# Patient Record
Sex: MALE | Race: WHITE | NOT HISPANIC OR LATINO | Employment: FULL TIME | URBAN - METROPOLITAN AREA
[De-identification: names, ages, dates, MRNs, and addresses within clinical notes are randomized per-mention and may not be internally consistent; named-entity substitution may affect disease eponyms.]

---

## 2020-09-08 ENCOUNTER — LAB VISIT (OUTPATIENT)
Dept: PRIMARY CARE CLINIC | Facility: CLINIC | Age: 30
End: 2020-09-08
Payer: COMMERCIAL

## 2020-09-08 DIAGNOSIS — Z03.818 ENCOUNTER FOR OBSERVATION FOR SUSPECTED EXPOSURE TO OTHER BIOLOGICAL AGENTS RULED OUT: ICD-10-CM

## 2020-09-08 PROCEDURE — U0003 INFECTIOUS AGENT DETECTION BY NUCLEIC ACID (DNA OR RNA); SEVERE ACUTE RESPIRATORY SYNDROME CORONAVIRUS 2 (SARS-COV-2) (CORONAVIRUS DISEASE [COVID-19]), AMPLIFIED PROBE TECHNIQUE, MAKING USE OF HIGH THROUGHPUT TECHNOLOGIES AS DESCRIBED BY CMS-2020-01-R: HCPCS

## 2020-09-09 LAB — SARS-COV-2 RNA RESP QL NAA+PROBE: NOT DETECTED

## 2020-11-02 ENCOUNTER — OFFICE VISIT (OUTPATIENT)
Dept: ENDOCRINOLOGY | Facility: CLINIC | Age: 30
End: 2020-11-02
Payer: COMMERCIAL

## 2020-11-02 VITALS
BODY MASS INDEX: 23.97 KG/M2 | DIASTOLIC BLOOD PRESSURE: 76 MMHG | OXYGEN SATURATION: 96 % | WEIGHT: 171.19 LBS | HEART RATE: 88 BPM | RESPIRATION RATE: 18 BRPM | HEIGHT: 71 IN | SYSTOLIC BLOOD PRESSURE: 118 MMHG

## 2020-11-02 DIAGNOSIS — E16.2 HYPOGLYCEMIA: ICD-10-CM

## 2020-11-02 DIAGNOSIS — H35.049 RETINAL MICROANEURYSM, UNSPECIFIED LATERALITY: ICD-10-CM

## 2020-11-02 DIAGNOSIS — E10.9 TYPE 1 DIABETES MELLITUS WITHOUT COMPLICATION: Primary | ICD-10-CM

## 2020-11-02 PROCEDURE — 99999 PR PBB SHADOW E&M-EST. PATIENT-LVL IV: CPT | Mod: PBBFAC,,, | Performed by: INTERNAL MEDICINE

## 2020-11-02 PROCEDURE — 3008F BODY MASS INDEX DOCD: CPT | Mod: CPTII,S$GLB,, | Performed by: INTERNAL MEDICINE

## 2020-11-02 PROCEDURE — 99204 OFFICE O/P NEW MOD 45 MIN: CPT | Mod: S$GLB,,, | Performed by: INTERNAL MEDICINE

## 2020-11-02 PROCEDURE — 99204 PR OFFICE/OUTPT VISIT, NEW, LEVL IV, 45-59 MIN: ICD-10-PCS | Mod: S$GLB,,, | Performed by: INTERNAL MEDICINE

## 2020-11-02 PROCEDURE — 99999 PR PBB SHADOW E&M-EST. PATIENT-LVL IV: ICD-10-PCS | Mod: PBBFAC,,, | Performed by: INTERNAL MEDICINE

## 2020-11-02 PROCEDURE — 3008F PR BODY MASS INDEX (BMI) DOCUMENTED: ICD-10-PCS | Mod: CPTII,S$GLB,, | Performed by: INTERNAL MEDICINE

## 2020-11-02 RX ORDER — BLOOD-GLUCOSE TRANSMITTER
EACH MISCELLANEOUS
COMMUNITY
Start: 2015-07-06 | End: 2020-11-02

## 2020-11-02 RX ORDER — FLASH GLUCOSE SENSOR
KIT MISCELLANEOUS
COMMUNITY
Start: 2020-10-30 | End: 2021-01-26

## 2020-11-02 RX ORDER — INSULIN PUMP CONTROLLER
1 EACH MISCELLANEOUS
Qty: 10 EACH | Refills: 11 | Status: SHIPPED | OUTPATIENT
Start: 2020-11-02 | End: 2022-01-31

## 2020-11-02 RX ORDER — BLOOD-GLUCOSE TRANSMITTER
2 EACH MISCELLANEOUS
Qty: 2 DEVICE | Refills: 1 | Status: SHIPPED | OUTPATIENT
Start: 2020-11-02 | End: 2021-12-08

## 2020-11-02 RX ORDER — INSULIN GLARGINE 100 [IU]/ML
INJECTION, SOLUTION SUBCUTANEOUS
Qty: 1 SYRINGE | Refills: 3 | Status: SHIPPED | OUTPATIENT
Start: 2020-11-02 | End: 2021-12-08

## 2020-11-02 RX ORDER — INSULIN LISPRO 100 [IU]/ML
INJECTION, SOLUTION INTRAVENOUS; SUBCUTANEOUS
COMMUNITY
Start: 2015-06-26 | End: 2020-11-02

## 2020-11-02 RX ORDER — INSULIN GLARGINE 100 [IU]/ML
20 INJECTION, SOLUTION SUBCUTANEOUS DAILY
Refills: 0 | Status: CANCELLED | OUTPATIENT
Start: 2020-11-02 | End: 2021-11-02

## 2020-11-02 RX ORDER — BLOOD-GLUCOSE SENSOR
3 EACH MISCELLANEOUS
Qty: 3 DEVICE | Refills: 11 | Status: SHIPPED | OUTPATIENT
Start: 2020-11-02 | End: 2022-01-31

## 2020-11-02 RX ORDER — PEN NEEDLE, DIABETIC 30 GX3/16"
1 NEEDLE, DISPOSABLE MISCELLANEOUS DAILY
Qty: 30 EACH | Refills: 3 | Status: SHIPPED | OUTPATIENT
Start: 2020-11-02

## 2020-11-02 RX ORDER — INSULIN PUMP CONTROLLER
1 EACH MISCELLANEOUS ONCE
Qty: 1 EACH | Refills: 0 | Status: SHIPPED | OUTPATIENT
Start: 2020-11-02 | End: 2020-11-02

## 2020-11-02 RX ORDER — INSULIN ASPART 100 [IU]/ML
INJECTION, SOLUTION INTRAVENOUS; SUBCUTANEOUS
COMMUNITY
Start: 2020-10-03 | End: 2021-02-24 | Stop reason: SDUPTHER

## 2020-11-02 RX ORDER — INSULIN PUMP CONTROLLER
EACH MISCELLANEOUS
COMMUNITY
Start: 2020-10-28 | End: 2020-11-02

## 2020-11-02 RX ORDER — INSULIN ASPART 100 [IU]/ML
INJECTION, SOLUTION INTRAVENOUS; SUBCUTANEOUS
Status: CANCELLED | OUTPATIENT
Start: 2020-11-02 | End: 2021-11-02

## 2020-11-02 RX ORDER — BLOOD-GLUCOSE SENSOR
EACH MISCELLANEOUS
COMMUNITY
Start: 2015-07-08 | End: 2020-11-02

## 2020-11-02 NOTE — ASSESSMENT & PLAN NOTE
Per patient prior retina specialist unsure if related to trauma or DM  Refer to ophthalmology for ongoing care

## 2020-11-02 NOTE — ASSESSMENT & PLAN NOTE
Pump download reviewed. Not typically entering glucose with meals or for corrections  Using Rocío but would prefer to return to Dexcom which I agree would be better sensor for him with alerts and improved accuracy at low glucose    Based on trends will make the following changes:  Basal rate from 2-4 AM to 0.550    Change lunch carb ratio to 1:10    Add ISF from 12PM-12AM 1:50    Change IAT to 3.5 hours    BG threshold 100-->120    Enter glucose for all boluses    Fasting labs next few weeks

## 2020-11-02 NOTE — PROGRESS NOTES
Jamshid Norwood is a 30 y.o. male referred by Aaareferral Self for evaluation of T1DM    History of Present Illness  T1DM  Diagnosed at age 10 (2001)  Known complications: possible retinopathy although changes may be related to previous trauma to R eye    Has been managed by pediatric endocrinologist until recent move from [place] to [place]. 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 A1c in 7% range during that time  Started Omnipod and Dexcom around age 19-22 with improvement in A1c    Does not always use bolus wizard for correction. Will do math in his head. Finds that he has been having lows and night and tried to change basal but still around 4 AM. Also will have lows if takes correction in afternoon or at night    Tried fiasp in the past which didn't work well. Led to site issues    Likes omnipod and would like to stay on this      Current Diabetes Regimen:  Omnipod with novolog      Timing of prandial insulin: 10-15 minutes particularly with breakfast; closer to lunch and dinner  Omitted doses: denies    Last Hgb A1C:  No results found for: LABA1C, HGBA1C    Glucose Monitoring:  Meter/CGM: Rocío but would like to go back to Dexcom  Reviewed data in Rocío dave and gave information to link account    TIR  <70 12%   71%  181-240 13%  >240 4%      Hypoglycemic Episodes:  See above    Screening / DM Complications:    Nephropathy:  ACEi/ARB: Not taking  No results found for: MICALBCREAT    Last Lipid Panel:  Statin: Not taking  No results found for: LDLCALC      Last foot exam Most Recent Foot Exam Date: Not Found  Last eye exam Most Recent Eye Exam Date: Not Found;  no laser surgery or DR  Trauma to R eye in [facility]    No results found for: TSH    No results found for: TTGIGA            Current Outpatient Medications:     blood sugar diagnostic Strp, , Disp: , Rfl:     FREEOneRoofYLE ROCÍO 14 DAY SENSOR Kit, , Disp: , Rfl:     blood-glucose sensor  "(DEXCOM G6 SENSOR) Shannon, 3 Devices by Misc.(Non-Drug; Combo Route) route every 30 days., Disp: 3 Device, Rfl: 11    blood-glucose transmitter (DEXCOM G6 TRANSMITTER) Shannon, 2 Devices by Misc.(Non-Drug; Combo Route) route every 6 (six) months., Disp: 2 Device, Rfl: 1    insulin (LANTUS SOLOSTAR U-100 INSULIN) glargine 100 units/mL (3mL) SubQ pen, Take 20 units once daily in event of pump failure, Disp: 1 Syringe, Rfl: 3    insulin pump cartridge (OMNIPOD DASH 5 PACK POD) Crtg, Inject 1 each into the skin Every 3 (three) days., Disp: 10 each, Rfl: 11    insulin pump controller (OMNIPOD DASH PDM KIT) Misc, 1 each by Misc.(Non-Drug; Combo Route) route once. for 1 dose, Disp: 1 each, Rfl: 0    pen needle, diabetic (BD ULTRA-FINE DAMIR PEN NEEDLE) 32 gauge x 5/32" Ndle, 1 each by Misc.(Non-Drug; Combo Route) route once daily., Disp: 30 each, Rfl: 3    Review of Systems   Constitutional: Negative for fever.   Eyes: Negative for pain.   Respiratory: Negative for shortness of breath.    Cardiovascular: Negative for chest pain and leg swelling.   Gastrointestinal: Negative for abdominal pain.   Genitourinary: Negative for dysuria.   Musculoskeletal: Negative for arthralgias.   Skin: Negative for rash.   Neurological: Negative for headaches.   Psychiatric/Behavioral: Negative for confusion.       Objective:     Vitals:    11/02/20 1409   BP: 118/76   Pulse: 88   Resp: 18     Wt Readings from Last 3 Encounters:   11/02/20 77.7 kg (171 lb 3 oz)     Body mass index is 23.88 kg/m².  Physical Exam  Constitutional:       Appearance: He is well-developed.   HENT:      Head: Normocephalic.      Right Ear: External ear normal.      Left Ear: External ear normal.   Eyes:      Conjunctiva/sclera: Conjunctivae normal.      Pupils: Pupils are equal, round, and reactive to light.   Neck:      Thyroid: No thyromegaly.      Trachea: No tracheal deviation.      Comments: No thyromegaly or nodules  Cardiovascular:      Rate and Rhythm: " Normal rate and regular rhythm.      Heart sounds: No murmur.      Comments: No LE edema  Pulmonary:      Effort: Pulmonary effort is normal.      Breath sounds: Normal breath sounds.   Abdominal:      General: There is no distension.      Palpations: Abdomen is soft. There is no mass.      Tenderness: There is no abdominal tenderness.      Hernia: No hernia is present.      Comments: No scar tissue. Often uses legs for pump sites   Skin:     General: Skin is warm.      Findings: No rash.      Comments: No nodules   Neurological:      Mental Status: He is alert and oriented to person, place, and time.   Psychiatric:         Judgment: Judgment normal.         Labs    Chemistry    No results found for: NA, K, CL, CO2, BUN, CREATININE, GLU No results found for: CALCIUM, ALKPHOS, AST, ALT, BILITOT, ESTGFRAFRICA, EGFRNONAA           Assessment and Plan     Type 1 diabetes  Pump download reviewed. Not typically entering glucose with meals or for corrections  Using Rocío but would prefer to return to Dexcom which I agree would be better sensor for him with alerts and improved accuracy at low glucose    Based on trends will make the following changes:  Basal rate from 2-4 AM to 0.550    Change lunch carb ratio to 1:10    Add ISF from 12PM-12AM 1:50    Change IAT to 3.5 hours    BG threshold 100-->120    Enter glucose for all boluses    Fasting labs next few weeks    Retinal microaneurysm  Per patient prior retina specialist unsure if related to trauma or DM  Refer to ophthalmology for ongoing care      Hypoglycemia  See above        RTC 3 months    Katie Major MD

## 2020-11-04 ENCOUNTER — LAB VISIT (OUTPATIENT)
Dept: LAB | Facility: HOSPITAL | Age: 30
End: 2020-11-04
Attending: INTERNAL MEDICINE
Payer: COMMERCIAL

## 2020-11-04 DIAGNOSIS — E10.9 TYPE 1 DIABETES MELLITUS WITHOUT COMPLICATION: ICD-10-CM

## 2020-11-04 LAB
ALBUMIN SERPL BCP-MCNC: 3.9 G/DL (ref 3.5–5.2)
ALP SERPL-CCNC: 73 U/L (ref 55–135)
ALT SERPL W/O P-5'-P-CCNC: 21 U/L (ref 10–44)
ANION GAP SERPL CALC-SCNC: 10 MMOL/L (ref 8–16)
AST SERPL-CCNC: 21 U/L (ref 10–40)
BILIRUB SERPL-MCNC: 0.8 MG/DL (ref 0.1–1)
BUN SERPL-MCNC: 14 MG/DL (ref 6–20)
CALCIUM SERPL-MCNC: 9 MG/DL (ref 8.7–10.5)
CHLORIDE SERPL-SCNC: 101 MMOL/L (ref 95–110)
CHOLEST SERPL-MCNC: 156 MG/DL (ref 120–199)
CHOLEST/HDLC SERPL: 2.4 {RATIO} (ref 2–5)
CO2 SERPL-SCNC: 27 MMOL/L (ref 23–29)
CREAT SERPL-MCNC: 0.9 MG/DL (ref 0.5–1.4)
EST. GFR  (AFRICAN AMERICAN): >60 ML/MIN/1.73 M^2
EST. GFR  (NON AFRICAN AMERICAN): >60 ML/MIN/1.73 M^2
ESTIMATED AVG GLUCOSE: 154 MG/DL (ref 68–131)
GLUCOSE SERPL-MCNC: 175 MG/DL (ref 70–110)
HBA1C MFR BLD HPLC: 7 % (ref 4–5.6)
HDLC SERPL-MCNC: 66 MG/DL (ref 40–75)
HDLC SERPL: 42.3 % (ref 20–50)
LDLC SERPL CALC-MCNC: 81 MG/DL (ref 63–159)
NONHDLC SERPL-MCNC: 90 MG/DL
POTASSIUM SERPL-SCNC: 4.3 MMOL/L (ref 3.5–5.1)
PROT SERPL-MCNC: 7.1 G/DL (ref 6–8.4)
SODIUM SERPL-SCNC: 138 MMOL/L (ref 136–145)
TRIGL SERPL-MCNC: 45 MG/DL (ref 30–150)
TSH SERPL DL<=0.005 MIU/L-ACNC: 1.32 UIU/ML (ref 0.4–4)

## 2020-11-04 PROCEDURE — 80053 COMPREHEN METABOLIC PANEL: CPT

## 2020-11-04 PROCEDURE — 84443 ASSAY THYROID STIM HORMONE: CPT

## 2020-11-04 PROCEDURE — 36415 COLL VENOUS BLD VENIPUNCTURE: CPT

## 2020-11-04 PROCEDURE — 80061 LIPID PANEL: CPT

## 2020-11-04 PROCEDURE — 83036 HEMOGLOBIN GLYCOSYLATED A1C: CPT

## 2020-11-10 ENCOUNTER — TELEPHONE (OUTPATIENT)
Dept: ENDOCRINOLOGY | Facility: CLINIC | Age: 30
End: 2020-11-10

## 2020-11-16 ENCOUNTER — TELEPHONE (OUTPATIENT)
Dept: ENDOCRINOLOGY | Facility: CLINIC | Age: 30
End: 2020-11-16

## 2020-11-16 NOTE — TELEPHONE ENCOUNTER
----- Message from Stefany Huynh sent at 11/16/2020  8:16 AM CST -----  Contact: sobia Giang is asking for a call back in regards to the pts prescriptions. He stated that he need clarity on the quantity     insulin (LANTUS SOLOSTAR U-100 INSULIN) glargine 100 units/mL (3mL) SubQ pen    insulin pump cartridge (OMNIPOD DASH 5 PACK POD) Crtg 10 each     blood-glucose transmitter (DEXCOM G6 TRANSMITTER) Shannon 2 Device     Contact info- [phone]    Reference #- [id number]

## 2020-11-19 ENCOUNTER — TELEPHONE (OUTPATIENT)
Dept: ENDOCRINOLOGY | Facility: CLINIC | Age: 30
End: 2020-11-19

## 2021-01-25 ENCOUNTER — PATIENT MESSAGE (OUTPATIENT)
Dept: ENDOCRINOLOGY | Facility: CLINIC | Age: 31
End: 2021-01-25

## 2021-01-26 ENCOUNTER — PATIENT MESSAGE (OUTPATIENT)
Dept: ENDOCRINOLOGY | Facility: CLINIC | Age: 31
End: 2021-01-26

## 2021-01-26 RX ORDER — FLASH GLUCOSE SENSOR
1 KIT MISCELLANEOUS
Qty: 2 KIT | Refills: 11 | Status: SHIPPED | OUTPATIENT
Start: 2021-01-26 | End: 2021-06-16

## 2021-01-27 ENCOUNTER — PATIENT MESSAGE (OUTPATIENT)
Dept: ENDOCRINOLOGY | Facility: CLINIC | Age: 31
End: 2021-01-27

## 2021-02-09 ENCOUNTER — TELEPHONE (OUTPATIENT)
Dept: ENDOCRINOLOGY | Facility: CLINIC | Age: 31
End: 2021-02-09

## 2021-02-10 ENCOUNTER — TELEPHONE (OUTPATIENT)
Dept: ENDOCRINOLOGY | Facility: CLINIC | Age: 31
End: 2021-02-10

## 2021-02-23 ENCOUNTER — TELEPHONE (OUTPATIENT)
Dept: ENDOCRINOLOGY | Facility: CLINIC | Age: 31
End: 2021-02-23

## 2021-02-24 ENCOUNTER — OFFICE VISIT (OUTPATIENT)
Dept: ENDOCRINOLOGY | Facility: CLINIC | Age: 31
End: 2021-02-24
Payer: COMMERCIAL

## 2021-02-24 VITALS
DIASTOLIC BLOOD PRESSURE: 82 MMHG | WEIGHT: 169.75 LBS | SYSTOLIC BLOOD PRESSURE: 110 MMHG | BODY MASS INDEX: 23.68 KG/M2 | OXYGEN SATURATION: 99 % | HEART RATE: 70 BPM | RESPIRATION RATE: 16 BRPM

## 2021-02-24 DIAGNOSIS — Z96.41 INSULIN PUMP IN PLACE: ICD-10-CM

## 2021-02-24 DIAGNOSIS — E10.9 TYPE 1 DIABETES MELLITUS WITHOUT COMPLICATION: Primary | ICD-10-CM

## 2021-02-24 DIAGNOSIS — E16.2 HYPOGLYCEMIA: ICD-10-CM

## 2021-02-24 DIAGNOSIS — H35.049 RETINAL MICROANEURYSM, UNSPECIFIED LATERALITY: ICD-10-CM

## 2021-02-24 PROCEDURE — 99999 PR PBB SHADOW E&M-EST. PATIENT-LVL IV: ICD-10-PCS | Mod: PBBFAC,,,

## 2021-02-24 PROCEDURE — 99214 OFFICE O/P EST MOD 30 MIN: CPT | Mod: 25,S$GLB,, | Performed by: INTERNAL MEDICINE

## 2021-02-24 PROCEDURE — 95251 PR GLUCOSE MONITOR, 72 HOUR, PHYS INTERP: ICD-10-PCS | Mod: S$GLB,,, | Performed by: INTERNAL MEDICINE

## 2021-02-24 PROCEDURE — 95251 CONT GLUC MNTR ANALYSIS I&R: CPT | Mod: S$GLB,,, | Performed by: INTERNAL MEDICINE

## 2021-02-24 PROCEDURE — 99214 PR OFFICE/OUTPT VISIT, EST, LEVL IV, 30-39 MIN: ICD-10-PCS | Mod: 25,S$GLB,, | Performed by: INTERNAL MEDICINE

## 2021-02-24 PROCEDURE — 99999 PR PBB SHADOW E&M-EST. PATIENT-LVL IV: CPT | Mod: PBBFAC,,,

## 2021-02-24 PROCEDURE — 3051F HG A1C>EQUAL 7.0%<8.0%: CPT | Mod: CPTII,S$GLB,, | Performed by: INTERNAL MEDICINE

## 2021-02-24 PROCEDURE — 3051F PR MOST RECENT HEMOGLOBIN A1C LEVEL 7.0 - < 8.0%: ICD-10-PCS | Mod: CPTII,S$GLB,, | Performed by: INTERNAL MEDICINE

## 2021-02-24 RX ORDER — INSULIN ASPART 100 [IU]/ML
INJECTION, SOLUTION INTRAVENOUS; SUBCUTANEOUS
Qty: 2 VIAL | Refills: 8 | Status: SHIPPED | OUTPATIENT
Start: 2021-02-24 | End: 2022-01-19 | Stop reason: SDUPTHER

## 2021-05-12 ENCOUNTER — OFFICE VISIT (OUTPATIENT)
Dept: OPTOMETRY | Facility: CLINIC | Age: 31
End: 2021-05-12
Payer: COMMERCIAL

## 2021-05-12 DIAGNOSIS — E10.9 TYPE 1 DIABETES MELLITUS WITHOUT COMPLICATION: ICD-10-CM

## 2021-05-12 DIAGNOSIS — Z96.41 INSULIN PUMP IN PLACE: ICD-10-CM

## 2021-05-12 DIAGNOSIS — Z98.890 HISTORY OF REPAIR OF RETINAL DEFECT BY LASER PHOTOCOAGULATION: ICD-10-CM

## 2021-05-12 DIAGNOSIS — H52.13 MYOPIA, BILATERAL: Primary | ICD-10-CM

## 2021-05-12 PROCEDURE — 92004 PR EYE EXAM, NEW PATIENT,COMPREHESV: ICD-10-PCS | Mod: S$GLB,,, | Performed by: OPTOMETRIST

## 2021-05-12 PROCEDURE — 99999 PR PBB SHADOW E&M-EST. PATIENT-LVL II: ICD-10-PCS | Mod: PBBFAC,,, | Performed by: OPTOMETRIST

## 2021-05-12 PROCEDURE — 99999 PR PBB SHADOW E&M-EST. PATIENT-LVL II: CPT | Mod: PBBFAC,,, | Performed by: OPTOMETRIST

## 2021-05-12 PROCEDURE — 92004 COMPRE OPH EXAM NEW PT 1/>: CPT | Mod: S$GLB,,, | Performed by: OPTOMETRIST

## 2021-05-12 PROCEDURE — 92250 COLOR FUNDUS PHOTOGRAPHY - OU - BOTH EYES: ICD-10-PCS | Mod: S$GLB,,, | Performed by: OPTOMETRIST

## 2021-05-12 PROCEDURE — 92015 PR REFRACTION: ICD-10-PCS | Mod: S$GLB,,, | Performed by: OPTOMETRIST

## 2021-05-12 PROCEDURE — 92015 DETERMINE REFRACTIVE STATE: CPT | Mod: S$GLB,,, | Performed by: OPTOMETRIST

## 2021-05-12 PROCEDURE — 92250 FUNDUS PHOTOGRAPHY W/I&R: CPT | Mod: S$GLB,,, | Performed by: OPTOMETRIST

## 2021-05-26 ENCOUNTER — PATIENT OUTREACH (OUTPATIENT)
Dept: ENDOCRINOLOGY | Facility: CLINIC | Age: 31
End: 2021-05-26

## 2021-05-26 ENCOUNTER — OFFICE VISIT (OUTPATIENT)
Dept: ENDOCRINOLOGY | Facility: CLINIC | Age: 31
End: 2021-05-26
Payer: COMMERCIAL

## 2021-05-26 VITALS
SYSTOLIC BLOOD PRESSURE: 117 MMHG | OXYGEN SATURATION: 97 % | BODY MASS INDEX: 22.81 KG/M2 | WEIGHT: 162.94 LBS | DIASTOLIC BLOOD PRESSURE: 73 MMHG | HEART RATE: 87 BPM | HEIGHT: 71 IN

## 2021-05-26 DIAGNOSIS — E10.9 TYPE 1 DIABETES MELLITUS WITHOUT COMPLICATION: ICD-10-CM

## 2021-05-26 DIAGNOSIS — Z96.41 INSULIN PUMP IN PLACE: ICD-10-CM

## 2021-05-26 DIAGNOSIS — H35.049 RETINAL MICROANEURYSM, UNSPECIFIED LATERALITY: ICD-10-CM

## 2021-05-26 PROCEDURE — 3051F HG A1C>EQUAL 7.0%<8.0%: CPT | Mod: CPTII,S$GLB,, | Performed by: INTERNAL MEDICINE

## 2021-05-26 PROCEDURE — 3051F PR MOST RECENT HEMOGLOBIN A1C LEVEL 7.0 - < 8.0%: ICD-10-PCS | Mod: CPTII,S$GLB,, | Performed by: INTERNAL MEDICINE

## 2021-05-26 PROCEDURE — 95251 CONT GLUC MNTR ANALYSIS I&R: CPT | Mod: S$GLB,,, | Performed by: INTERNAL MEDICINE

## 2021-05-26 PROCEDURE — 95251 PR GLUCOSE MONITOR, 72 HOUR, PHYS INTERP: ICD-10-PCS | Mod: S$GLB,,, | Performed by: INTERNAL MEDICINE

## 2021-05-26 PROCEDURE — 99214 OFFICE O/P EST MOD 30 MIN: CPT | Mod: 25,S$GLB,, | Performed by: INTERNAL MEDICINE

## 2021-05-26 PROCEDURE — 99214 PR OFFICE/OUTPT VISIT, EST, LEVL IV, 30-39 MIN: ICD-10-PCS | Mod: 25,S$GLB,, | Performed by: INTERNAL MEDICINE

## 2021-05-26 PROCEDURE — 99999 PR PBB SHADOW E&M-EST. PATIENT-LVL IV: CPT | Mod: PBBFAC,,,

## 2021-05-26 PROCEDURE — 99999 PR PBB SHADOW E&M-EST. PATIENT-LVL IV: ICD-10-PCS | Mod: PBBFAC,,,

## 2021-06-09 ENCOUNTER — OFFICE VISIT (OUTPATIENT)
Dept: OPHTHALMOLOGY | Facility: CLINIC | Age: 31
End: 2021-06-09
Payer: COMMERCIAL

## 2021-06-09 DIAGNOSIS — E10.9 TYPE 1 DIABETES MELLITUS WITHOUT RETINOPATHY: ICD-10-CM

## 2021-06-09 DIAGNOSIS — H35.371 PRERETINAL FIBROSIS, RIGHT: Primary | ICD-10-CM

## 2021-06-09 PROCEDURE — 1126F PR PAIN SEVERITY QUANTIFIED, NO PAIN PRESENT: ICD-10-PCS | Mod: S$GLB,,, | Performed by: OPHTHALMOLOGY

## 2021-06-09 PROCEDURE — 92202 OPSCPY EXTND ON/MAC DRAW: CPT | Mod: S$GLB,,, | Performed by: OPHTHALMOLOGY

## 2021-06-09 PROCEDURE — 99999 PR PBB SHADOW E&M-EST. PATIENT-LVL III: ICD-10-PCS | Mod: PBBFAC,,, | Performed by: OPHTHALMOLOGY

## 2021-06-09 PROCEDURE — 92202 PR OPHTHALMOSCOPY, EXT, W/DRAW OPTIC NERVE/MACULA, I&R, UNI/BI: ICD-10-PCS | Mod: S$GLB,,, | Performed by: OPHTHALMOLOGY

## 2021-06-09 PROCEDURE — 1126F AMNT PAIN NOTED NONE PRSNT: CPT | Mod: S$GLB,,, | Performed by: OPHTHALMOLOGY

## 2021-06-09 PROCEDURE — 92134 CPTRZ OPH DX IMG PST SGM RTA: CPT | Mod: S$GLB,,, | Performed by: OPHTHALMOLOGY

## 2021-06-09 PROCEDURE — 92004 PR EYE EXAM, NEW PATIENT,COMPREHESV: ICD-10-PCS | Mod: S$GLB,,, | Performed by: OPHTHALMOLOGY

## 2021-06-09 PROCEDURE — 2023F DILAT RTA XM W/O RTNOPTHY: CPT | Mod: S$GLB,,, | Performed by: OPHTHALMOLOGY

## 2021-06-09 PROCEDURE — 92134 POSTERIOR SEGMENT OCT RETINA (OCULAR COHERENCE TOMOGRAPHY)-BOTH EYES: ICD-10-PCS | Mod: S$GLB,,, | Performed by: OPHTHALMOLOGY

## 2021-06-09 PROCEDURE — 92004 COMPRE OPH EXAM NEW PT 1/>: CPT | Mod: S$GLB,,, | Performed by: OPHTHALMOLOGY

## 2021-06-09 PROCEDURE — 2023F PR DILATED RETINAL EXAM W/O EVID OF RETINOPATHY: ICD-10-PCS | Mod: S$GLB,,, | Performed by: OPHTHALMOLOGY

## 2021-06-09 PROCEDURE — 99999 PR PBB SHADOW E&M-EST. PATIENT-LVL III: CPT | Mod: PBBFAC,,, | Performed by: OPHTHALMOLOGY

## 2021-06-16 ENCOUNTER — OFFICE VISIT (OUTPATIENT)
Dept: INTERNAL MEDICINE | Facility: CLINIC | Age: 31
End: 2021-06-16
Payer: COMMERCIAL

## 2021-06-16 VITALS
HEIGHT: 71 IN | WEIGHT: 163.13 LBS | OXYGEN SATURATION: 97 % | BODY MASS INDEX: 22.84 KG/M2 | DIASTOLIC BLOOD PRESSURE: 72 MMHG | HEART RATE: 84 BPM | SYSTOLIC BLOOD PRESSURE: 120 MMHG

## 2021-06-16 DIAGNOSIS — Z23 NEED FOR TDAP VACCINATION: ICD-10-CM

## 2021-06-16 DIAGNOSIS — Z00.00 ANNUAL PHYSICAL EXAM: Primary | ICD-10-CM

## 2021-06-16 DIAGNOSIS — E10.9 TYPE 1 DIABETES MELLITUS WITHOUT COMPLICATION: ICD-10-CM

## 2021-06-16 DIAGNOSIS — Z20.2 POSSIBLE EXPOSURE TO STD: ICD-10-CM

## 2021-06-16 PROCEDURE — 1126F PR PAIN SEVERITY QUANTIFIED, NO PAIN PRESENT: ICD-10-PCS | Mod: S$GLB,,, | Performed by: FAMILY MEDICINE

## 2021-06-16 PROCEDURE — 99385 PR PREVENTIVE VISIT,NEW,18-39: ICD-10-PCS | Mod: 25,S$GLB,, | Performed by: FAMILY MEDICINE

## 2021-06-16 PROCEDURE — 99999 PR PBB SHADOW E&M-EST. PATIENT-LVL IV: CPT | Mod: PBBFAC,,, | Performed by: FAMILY MEDICINE

## 2021-06-16 PROCEDURE — 3008F PR BODY MASS INDEX (BMI) DOCUMENTED: ICD-10-PCS | Mod: CPTII,S$GLB,, | Performed by: FAMILY MEDICINE

## 2021-06-16 PROCEDURE — 99385 PREV VISIT NEW AGE 18-39: CPT | Mod: 25,S$GLB,, | Performed by: FAMILY MEDICINE

## 2021-06-16 PROCEDURE — 1126F AMNT PAIN NOTED NONE PRSNT: CPT | Mod: S$GLB,,, | Performed by: FAMILY MEDICINE

## 2021-06-16 PROCEDURE — 90471 TDAP VACCINE GREATER THAN OR EQUAL TO 7YO IM: ICD-10-PCS | Mod: S$GLB,,, | Performed by: FAMILY MEDICINE

## 2021-06-16 PROCEDURE — 3008F BODY MASS INDEX DOCD: CPT | Mod: CPTII,S$GLB,, | Performed by: FAMILY MEDICINE

## 2021-06-16 PROCEDURE — 90471 IMMUNIZATION ADMIN: CPT | Mod: S$GLB,,, | Performed by: FAMILY MEDICINE

## 2021-06-16 PROCEDURE — 90715 TDAP VACCINE GREATER THAN OR EQUAL TO 7YO IM: ICD-10-PCS | Mod: S$GLB,,, | Performed by: FAMILY MEDICINE

## 2021-06-16 PROCEDURE — 99999 PR PBB SHADOW E&M-EST. PATIENT-LVL IV: ICD-10-PCS | Mod: PBBFAC,,, | Performed by: FAMILY MEDICINE

## 2021-06-16 PROCEDURE — 3051F PR MOST RECENT HEMOGLOBIN A1C LEVEL 7.0 - < 8.0%: ICD-10-PCS | Mod: CPTII,S$GLB,, | Performed by: FAMILY MEDICINE

## 2021-06-16 PROCEDURE — 3051F HG A1C>EQUAL 7.0%<8.0%: CPT | Mod: CPTII,S$GLB,, | Performed by: FAMILY MEDICINE

## 2021-06-16 PROCEDURE — 90715 TDAP VACCINE 7 YRS/> IM: CPT | Mod: S$GLB,,, | Performed by: FAMILY MEDICINE

## 2021-07-02 ENCOUNTER — LAB VISIT (OUTPATIENT)
Dept: LAB | Facility: HOSPITAL | Age: 31
End: 2021-07-02
Attending: FAMILY MEDICINE
Payer: COMMERCIAL

## 2021-07-02 DIAGNOSIS — Z20.2 POSSIBLE EXPOSURE TO STD: ICD-10-CM

## 2021-07-02 DIAGNOSIS — Z00.00 ANNUAL PHYSICAL EXAM: ICD-10-CM

## 2021-07-02 LAB
ALBUMIN SERPL BCP-MCNC: 3.7 G/DL (ref 3.5–5.2)
ALP SERPL-CCNC: 70 U/L (ref 55–135)
ALT SERPL W/O P-5'-P-CCNC: 26 U/L (ref 10–44)
ANION GAP SERPL CALC-SCNC: 8 MMOL/L (ref 8–16)
AST SERPL-CCNC: 20 U/L (ref 10–40)
BASOPHILS # BLD AUTO: 0.06 K/UL (ref 0–0.2)
BASOPHILS NFR BLD: 1.1 % (ref 0–1.9)
BILIRUB SERPL-MCNC: 0.9 MG/DL (ref 0.1–1)
BUN SERPL-MCNC: 10 MG/DL (ref 6–20)
CALCIUM SERPL-MCNC: 9.1 MG/DL (ref 8.7–10.5)
CHLORIDE SERPL-SCNC: 104 MMOL/L (ref 95–110)
CHOLEST SERPL-MCNC: 140 MG/DL (ref 120–199)
CHOLEST/HDLC SERPL: 2.6 {RATIO} (ref 2–5)
CO2 SERPL-SCNC: 25 MMOL/L (ref 23–29)
CREAT SERPL-MCNC: 0.9 MG/DL (ref 0.5–1.4)
DIFFERENTIAL METHOD: NORMAL
EOSINOPHIL # BLD AUTO: 0.2 K/UL (ref 0–0.5)
EOSINOPHIL NFR BLD: 2.8 % (ref 0–8)
ERYTHROCYTE [DISTWIDTH] IN BLOOD BY AUTOMATED COUNT: 11.7 % (ref 11.5–14.5)
EST. GFR  (AFRICAN AMERICAN): >60 ML/MIN/1.73 M^2
EST. GFR  (NON AFRICAN AMERICAN): >60 ML/MIN/1.73 M^2
ESTIMATED AVG GLUCOSE: 128 MG/DL (ref 68–131)
GLUCOSE SERPL-MCNC: 154 MG/DL (ref 70–110)
HBA1C MFR BLD: 6.1 % (ref 4–5.6)
HCT VFR BLD AUTO: 42.2 % (ref 40–54)
HDLC SERPL-MCNC: 54 MG/DL (ref 40–75)
HDLC SERPL: 38.6 % (ref 20–50)
HGB BLD-MCNC: 14 G/DL (ref 14–18)
IMM GRANULOCYTES # BLD AUTO: 0.01 K/UL (ref 0–0.04)
IMM GRANULOCYTES NFR BLD AUTO: 0.2 % (ref 0–0.5)
LDLC SERPL CALC-MCNC: 78.8 MG/DL (ref 63–159)
LYMPHOCYTES # BLD AUTO: 1.7 K/UL (ref 1–4.8)
LYMPHOCYTES NFR BLD: 31.6 % (ref 18–48)
MCH RBC QN AUTO: 30 PG (ref 27–31)
MCHC RBC AUTO-ENTMCNC: 33.2 G/DL (ref 32–36)
MCV RBC AUTO: 90 FL (ref 82–98)
MONOCYTES # BLD AUTO: 0.6 K/UL (ref 0.3–1)
MONOCYTES NFR BLD: 11.2 % (ref 4–15)
NEUTROPHILS # BLD AUTO: 2.8 K/UL (ref 1.8–7.7)
NEUTROPHILS NFR BLD: 53.1 % (ref 38–73)
NONHDLC SERPL-MCNC: 86 MG/DL
NRBC BLD-RTO: 0 /100 WBC
PLATELET # BLD AUTO: 297 K/UL (ref 150–450)
PMV BLD AUTO: 12 FL (ref 9.2–12.9)
POTASSIUM SERPL-SCNC: 4.2 MMOL/L (ref 3.5–5.1)
PROT SERPL-MCNC: 7 G/DL (ref 6–8.4)
RBC # BLD AUTO: 4.67 M/UL (ref 4.6–6.2)
SODIUM SERPL-SCNC: 137 MMOL/L (ref 136–145)
TRIGL SERPL-MCNC: 36 MG/DL (ref 30–150)
TSH SERPL DL<=0.005 MIU/L-ACNC: 1.23 UIU/ML (ref 0.4–4)
WBC # BLD AUTO: 5.28 K/UL (ref 3.9–12.7)

## 2021-07-02 PROCEDURE — 87389 HIV-1 AG W/HIV-1&-2 AB AG IA: CPT | Performed by: FAMILY MEDICINE

## 2021-07-02 PROCEDURE — 86695 HERPES SIMPLEX TYPE 1 TEST: CPT | Performed by: FAMILY MEDICINE

## 2021-07-02 PROCEDURE — 36415 COLL VENOUS BLD VENIPUNCTURE: CPT | Performed by: FAMILY MEDICINE

## 2021-07-02 PROCEDURE — 86694 HERPES SIMPLEX NES ANTBDY: CPT | Performed by: FAMILY MEDICINE

## 2021-07-02 PROCEDURE — 86803 HEPATITIS C AB TEST: CPT | Performed by: FAMILY MEDICINE

## 2021-07-02 PROCEDURE — 84443 ASSAY THYROID STIM HORMONE: CPT | Performed by: FAMILY MEDICINE

## 2021-07-02 PROCEDURE — 83036 HEMOGLOBIN GLYCOSYLATED A1C: CPT | Performed by: FAMILY MEDICINE

## 2021-07-02 PROCEDURE — 86592 SYPHILIS TEST NON-TREP QUAL: CPT | Performed by: FAMILY MEDICINE

## 2021-07-02 PROCEDURE — 80061 LIPID PANEL: CPT | Performed by: FAMILY MEDICINE

## 2021-07-02 PROCEDURE — 86696 HERPES SIMPLEX TYPE 2 TEST: CPT | Performed by: FAMILY MEDICINE

## 2021-07-02 PROCEDURE — 85025 COMPLETE CBC W/AUTO DIFF WBC: CPT | Performed by: FAMILY MEDICINE

## 2021-07-02 PROCEDURE — 80053 COMPREHEN METABOLIC PANEL: CPT | Performed by: FAMILY MEDICINE

## 2021-07-03 LAB
HSV1 IGG SERPL QL IA: NEGATIVE
HSV2 IGG SERPL QL IA: NEGATIVE
RPR SER QL: NORMAL

## 2021-07-05 LAB
HCV AB SERPL QL IA: NEGATIVE
HIV 1+2 AB+HIV1 P24 AG SERPL QL IA: NEGATIVE

## 2021-07-06 LAB — HSV AB, IGM BY EIA: 0.19 INDEX

## 2021-07-14 ENCOUNTER — PATIENT MESSAGE (OUTPATIENT)
Dept: INTERNAL MEDICINE | Facility: CLINIC | Age: 31
End: 2021-07-14

## 2021-12-08 DIAGNOSIS — E10.9 TYPE 1 DIABETES MELLITUS WITHOUT COMPLICATION: ICD-10-CM

## 2021-12-08 RX ORDER — BLOOD-GLUCOSE TRANSMITTER
EACH MISCELLANEOUS
Qty: 1 EACH | Refills: 1 | Status: SHIPPED | OUTPATIENT
Start: 2021-12-08 | End: 2022-09-28

## 2021-12-08 RX ORDER — INSULIN GLARGINE 100 [IU]/ML
INJECTION, SOLUTION SUBCUTANEOUS
Qty: 15 ML | Refills: 1 | Status: SHIPPED | OUTPATIENT
Start: 2021-12-08 | End: 2023-09-20 | Stop reason: SDUPTHER

## 2022-01-19 ENCOUNTER — OFFICE VISIT (OUTPATIENT)
Dept: ENDOCRINOLOGY | Facility: CLINIC | Age: 32
End: 2022-01-19
Payer: COMMERCIAL

## 2022-01-19 ENCOUNTER — PATIENT MESSAGE (OUTPATIENT)
Dept: ENDOCRINOLOGY | Facility: CLINIC | Age: 32
End: 2022-01-19

## 2022-01-19 VITALS
OXYGEN SATURATION: 98 % | DIASTOLIC BLOOD PRESSURE: 26 MMHG | BODY MASS INDEX: 22.65 KG/M2 | WEIGHT: 162.38 LBS | HEART RATE: 78 BPM | SYSTOLIC BLOOD PRESSURE: 124 MMHG | TEMPERATURE: 98 F | RESPIRATION RATE: 18 BRPM

## 2022-01-19 DIAGNOSIS — E10.9 TYPE 1 DIABETES MELLITUS WITHOUT COMPLICATION: Primary | ICD-10-CM

## 2022-01-19 DIAGNOSIS — Z96.41 INSULIN PUMP IN PLACE: ICD-10-CM

## 2022-01-19 PROCEDURE — 99214 OFFICE O/P EST MOD 30 MIN: CPT | Mod: 25,S$GLB,, | Performed by: INTERNAL MEDICINE

## 2022-01-19 PROCEDURE — 95251 CONT GLUC MNTR ANALYSIS I&R: CPT | Mod: S$GLB,,, | Performed by: INTERNAL MEDICINE

## 2022-01-19 PROCEDURE — 99999 PR PBB SHADOW E&M-EST. PATIENT-LVL IV: CPT | Mod: PBBFAC,,,

## 2022-01-19 PROCEDURE — 99999 PR PBB SHADOW E&M-EST. PATIENT-LVL IV: ICD-10-PCS | Mod: PBBFAC,,,

## 2022-01-19 PROCEDURE — 99214 PR OFFICE/OUTPT VISIT, EST, LEVL IV, 30-39 MIN: ICD-10-PCS | Mod: 25,S$GLB,, | Performed by: INTERNAL MEDICINE

## 2022-01-19 PROCEDURE — 95251 PR GLUCOSE MONITOR, 72 HOUR, PHYS INTERP: ICD-10-PCS | Mod: S$GLB,,, | Performed by: INTERNAL MEDICINE

## 2022-01-19 RX ORDER — INSULIN ASPART 100 [IU]/ML
INJECTION, SOLUTION INTRAVENOUS; SUBCUTANEOUS
Qty: 2 EACH | Refills: 8 | Status: SHIPPED | OUTPATIENT
Start: 2022-01-19 | End: 2022-11-30 | Stop reason: SDUPTHER

## 2022-01-19 NOTE — PATIENT INSTRUCTIONS
Return to clinic in 6 months (A1c and microalbumin today if possible, other labs before next visit)    Continue with pump with following changes (changes in bold)    Basal Rate (total 17.9)              MN  -  4A:       0.450 U/hr              4A   -  12P:      0.750 U/hr              12P   -  4P:      0.850 U/hr              4P   -  8P:        0.900 U/hr              8P   -  10P:      0.850 U/hr              10P - MN:        0.700 U/hr     Carb Ratio              MN  -  1030A:   1:8              1030A  -  4P:   1:10 --> 9 and if needed can make it stronger to 1:8              4P - MN:          1:11 (consider going to 1:10 if notice requiring constant corrections after meals for the next few days)                   ISF              MN  -  12P:      1:40              12P -  MN:       1:50    Your blood test to screen for celiac disease was normal.

## 2022-01-19 NOTE — ASSESSMENT & PLAN NOTE
6.1 A1c controlled      -  Diet, exercise, lifestyle modifications and A1c Goals discussed     -  Hypoglycemia symptoms and plan of action discussed         -  Foot exam performed, no abnormal     -  Optometry seen within 1 year.  No retinopathy per patient     -  DARELL uptodate     -  Lipids reviewed                      PLAN:    -  Due to 72hr CGM and pump review w/ evidence of: Time in Range: 65%, and noted he needs to give himself some corrections after his lunch due to ICR not strong enough, will consider the following changes: Changes in bold    Pump/CGM: Omnipod and Dexcom - on Glooko     Basal Rate              MN  -  4A:       0.450 U/hr              4A   -  12P:      0.750 U/hr              12P   -  4P:      0.850 U/hr              4P   -  8P:        0.900 U/hr              8P   -  10P:      0.850 U/hr              10P - MN:        0.700 U/hr     Carb Ratio              MN  -  1030A:   1:8              1030A  -  4P:   1:10 --> Make stronger to 1:9 and if needed in 1 weeks can increase to 1:8              4P - MN:          1:11 --> Consider going to 1:10 if notice you are requiring constant corrections after meals for the next few days                   ISF              MN  -  12P:      1:40              12P -  MN:       1:50          -  Bolus 10 - 15 min before meals     -  Document glucose and carbs for bolus and correction     -  Avoid insulin stacking from bolus overcorrection     -  Correct hypoglycemias with 16g of carbs     -  Advised to change insulin pump set/pump site, refill reservoir every 3 days    -  Diabetic supplies and medications: reviewed      -  I personally reviewed CGM/PUMP data     Supplies/Refills:  -  Pt needs to call his supply company and have them fax to us the prescription     Labs Ordered:  -  A1c, comprehensive, lipid panel ,TSH, microalbumin

## 2022-01-19 NOTE — PROGRESS NOTES
I have reviewed and concur with Dr. Iveth Andersen's history, physical, assessment, and plan.  I have personally interviewed and examined the patient.  See below addendum for my evaluation and additional findings.    The patient is having postprandial hyperglycemia after lunch and and sometimes dinner for which he is giving manual boluses to correct instead of using his pump.  He is frequently not entering blood sugar when giving boluses for meals thus not getting any correction.  Having some lows related to stacking of manual boluses.  Will make lunch carbohydrate ratio more aggressive.  Patient again encouraged to avoid manual boluses and use pump settings for corrections.    Derrek De Los Santos MD

## 2022-01-19 NOTE — ASSESSMENT & PLAN NOTE
Per patient prior retina specialist unsure if related to trauma or DM  He is to follow with ophthalmologist

## 2022-01-19 NOTE — PROGRESS NOTES
Pump Clinic Return Visit    Chief Complaint   Patient presents with    Follow-up     Need renewal for Dexcom sensor and omnipod.         HPI:Jamshid Norwood is a 31 y.o. 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 Will do math in his head. Noted to give himself a few corrections after lunch likely due to ICR not strong enough              Not typically entering glucose for corrections, instead utilizing accurate correction while busy at work (using a correction of 40 to 50 units)    Timing of prandial insulin: 10-15 minutes particularly with breakfast; closer to lunch and dinner       Pump Settings     Basal Rate              MN  -  4A:        0.450 U/hr              4A   -  12P:      0.750 U/hr              12P   -  4P:      0.850 U/hr              4P   -  8P:        0.900 U/hr              8P   -  10P:      0.850 U/hr              10P - MN:        0.700 U/hr     Carb Ratio              MN  -  1030A:   1:8              1030A  -  MN:   1:10   4P - MN:  1:11                ISF              MN  -  12P:      1:40              12P -  MN:       1:50                 Target: 120 mg/dl              IAT: 3.5 hr                 TDD 41.2              Basal 42%; Bolus 58%              Sensor wear 93% of the time              GMI: 6.8%                 Time in Range: 65%                 Changes pump sites every 3 days: yes    Lab Results   Component Value Date    HGBA1C 6.1 (H) 07/02/2021       Glucose Monitoring:  Meter/CGM:  Dexcom  Sensor and pump report downloaded and reviewed, see report in media tab        Pt is monitoring blood glucose readings 4 times a day.  Needs >100 strips per month related to fluctuations with blood glucose reading, A1c trends, and activity level.     Hypoglycemic Episodes:  Rarely   Since last visit no severe hypoglycemic episodes requiring observer intervention     DKA: none     Screening / DM Complications:  Neuropathy: denies   Last foot exam Most Recent Foot Exam Date: 5/26/2021  Last eye exam Most Recent Eye Exam Date: 6/9/2021;  no laser surgery or DR  CVD/MI: denies           Lab Results   Component Value Date     TSH 1.319 11/04/2020      No results found for: TTGIGA     Diet/Exercise: 3 meals a day                 BF: low sugar yogurt and protein bar              L: salad and fruit, nuts, timing bolus difficult at work              D: variable at 5-6pm - carbs fluctuate     Night-time Snacking:  Not unless low sugar     Wt Change: none        A complete 14 point ROS conducted negative except for above      Lab Results   Component Value Date    TSH 1.230 07/02/2021     Vital Signs  BP (!) 124/26 (BP Location: Right arm, Patient Position: Sitting)   Pulse 78   Temp 97.9 °F (36.6 °C) (Temporal)   Resp 18   Wt 73.6 kg (162 lb 5.9 oz)   SpO2 98%   BMI 22.65 kg/m²     Physical Exam  Constitutional:       General: He is not in acute distress.  Pulmonary:      Effort: No respiratory distress.   Musculoskeletal:      Right lower leg: No edema.      Left lower leg: No edema.   Neurological:      Mental Status: He is oriented to person, place, and time.         Diabetes Management Status    Statin: Not taking  ACE/ARB: Not taking      Screening or Prevention Patient's value Goal Complete/Controlled?   HgA1C Testing and Control   Lab Results   Component Value Date    HGBA1C 6.1 (H) 07/02/2021      Annually/Less than 8% Yes   Lipid profile : 07/02/2021 Annually Yes   LDL control Lab Results   Component  Value Date    LDLCALC 78.8 07/02/2021    Annually/Less than 100 mg/dl  Yes   Nephropathy screening Lab Results   Component Value Date    LABMICR <2.5 11/04/2020     Lab Results   Component Value Date    PROTEINUA Negative 07/02/2021    Annually Yes   Blood pressure BP Readings from Last 1 Encounters:   01/19/22 (!) 124/26    Less than 140/90 Yes   Dilated retinal exam : 06/09/2021 Annually Yes   Foot exam   : 05/26/2021 Annually Yes        Lab Results   Component Value Date    HGBA1C 6.1 (H) 07/02/2021    HGBA1C 7.0 (H) 11/04/2020         Chemistry        Component Value Date/Time     07/02/2021 0857    K 4.2 07/02/2021 0857     07/02/2021 0857    CO2 25 07/02/2021 0857    BUN 10 07/02/2021 0857    CREATININE 0.9 07/02/2021 0857     (H) 07/02/2021 0857        Component Value Date/Time    CALCIUM 9.1 07/02/2021 0857    ALKPHOS 70 07/02/2021 0857    AST 20 07/02/2021 0857    ALT 26 07/02/2021 0857    BILITOT 0.9 07/02/2021 0857    ESTGFRAFRICA >60.0 07/02/2021 0857    EGFRNONAA >60.0 07/02/2021 0857           Lab Results   Component Value Date    MICALBCREAT Unable to calculate 11/04/2020     Lab Results   Component Value Date    CHOL 140 07/02/2021    CHOL 156 11/04/2020     Lab Results   Component Value Date    HDL 54 07/02/2021    HDL 66 11/04/2020     Lab Results   Component Value Date    LDLCALC 78.8 07/02/2021    LDLCALC 81.0 11/04/2020     Lab Results   Component Value Date    TRIG 36 07/02/2021    TRIG 45 11/04/2020     Lab Results   Component Value Date    CHOLHDL 38.6 07/02/2021    CHOLHDL 42.3 11/04/2020           Assessment/Plan  Type 1 diabetes  6.1 A1c controlled      -  Diet, exercise, lifestyle modifications and A1c Goals discussed     -  Hypoglycemia symptoms and plan of action discussed         -  Foot exam performed, no abnormal     -  Optometry seen within 1 year.  No retinopathy per patient     -  DARELL uptodate     -  Lipids reviewed                      PLAN:    -  Due to 72hr  CGM and pump review w/ evidence of: Time in Range: 65%, and noted he needs to give himself some corrections after his lunch due to ICR not strong enough, will consider the following changes: Changes in bold    Pump/CGM: Omnipod and Dexcom - on Glooko     Basal Rate              MN  -  4A:       0.450 U/hr              4A   -  12P:      0.750 U/hr              12P   -  4P:      0.850 U/hr              4P   -  8P:        0.900 U/hr              8P   -  10P:      0.850 U/hr              10P - MN:        0.700 U/hr     Carb Ratio              MN  -  1030A:   1:8              1030A  -  4P:   1:10 --> Make stronger to 1:9 and if needed in 1 weeks can increase to 1:8              4P - MN:          1:11 --> Consider going to 1:10 if notice you are requiring constant corrections after meals for the next few days                   ISF              MN  -  12P:      1:40              12P -  MN:       1:50          -  Bolus 10 - 15 min before meals     -  Document glucose and carbs for bolus and correction     -  Avoid insulin stacking from bolus overcorrection     -  Correct hypoglycemias with 16g of carbs     -  Advised to change insulin pump set/pump site, refill reservoir every 3 days    -  Diabetic supplies and medications: reviewed      -  I personally reviewed CGM/PUMP data     Supplies/Refills:  -  Pt needs to call his supply company and have them fax to us the prescription     Labs Ordered:  -  A1c, comprehensive, lipid panel ,TSH, microalbumin      Insulin pump in place   Continue Omnipod pump and Dexcom   Change infusion site every 3 days      Retinal microaneurysm  Per patient prior retina specialist unsure if related to trauma or DM  He is to follow with ophthalmologist             FOLLOW UP  Follow up in about 6 months (around 7/19/2022).       Pump backup plan  If the insulin pump is non functional and discontinued for anticipated more than 20 hours, please give daily injections of:  Long acting insulin 20  units  daily  Short acting insulin for meals according to carb ratios and sensitivity factor in the pump.    When the insulin pump is restarted, do not restart basal rates until at least 22 hours after the last long acting insulin injection. You can set a 0% temporary basal setting that will last until this time and use your pump to bolus for meals and correction.    For any technical insulin pump issues, please contact the insulin pump company; the toll free number is printed on the label on the back of the insulin pump.

## 2022-01-20 ENCOUNTER — PATIENT OUTREACH (OUTPATIENT)
Dept: DIABETES | Facility: CLINIC | Age: 32
End: 2022-01-20
Payer: COMMERCIAL

## 2022-01-20 NOTE — PROGRESS NOTES
DIABETES EDUCATION NOTE  01/19/2022     Jamshid Norwood was seen by me in the multidisciplinary insulin pump clinic and the following topics were addressed:    Pump/CGM site concerns?  [x] No  [] Yes    Can recognize/manage pump  [] No  [x] Yes  Malfunction?    Meal patterns:    Specific diet? Carb counts   Meals/day Varies 3   Snacks yea   Carb counting yes    Appropriate correction   [x] No  [] Yes  of lows/highs?    Ability to upload data from home? [] No  [x] Yes    Pump type Omnipod Dash  CGM Dexcom G6     Patient seen in pump clinic today.  Pump and CGM download were evaluated.  Covered back up insulin plan with patient in the event of pump failure/lack of supplies.  Advised to stay current with pump supply reorders. Reviewed site placement rotation and frequency of site changes.     Endo team evaluated download and will adjust IC because of post meal excursions.

## 2022-01-26 ENCOUNTER — PATIENT MESSAGE (OUTPATIENT)
Dept: ADMINISTRATIVE | Facility: HOSPITAL | Age: 32
End: 2022-01-26
Payer: COMMERCIAL

## 2022-04-02 ENCOUNTER — PATIENT MESSAGE (OUTPATIENT)
Dept: ADMINISTRATIVE | Facility: HOSPITAL | Age: 32
End: 2022-04-02
Payer: COMMERCIAL

## 2022-07-20 ENCOUNTER — PATIENT MESSAGE (OUTPATIENT)
Dept: ENDOCRINOLOGY | Facility: CLINIC | Age: 32
End: 2022-07-20
Payer: COMMERCIAL

## 2022-07-27 ENCOUNTER — LAB VISIT (OUTPATIENT)
Dept: LAB | Facility: HOSPITAL | Age: 32
End: 2022-07-27
Attending: GENERAL ACUTE CARE HOSPITAL
Payer: COMMERCIAL

## 2022-07-27 DIAGNOSIS — E10.9 TYPE 1 DIABETES MELLITUS WITHOUT COMPLICATION: ICD-10-CM

## 2022-07-27 LAB
ALBUMIN SERPL BCP-MCNC: 3.7 G/DL (ref 3.5–5.2)
ALP SERPL-CCNC: 72 U/L (ref 55–135)
ALT SERPL W/O P-5'-P-CCNC: 33 U/L (ref 10–44)
ANION GAP SERPL CALC-SCNC: 8 MMOL/L (ref 8–16)
AST SERPL-CCNC: 25 U/L (ref 10–40)
BILIRUB SERPL-MCNC: 1.3 MG/DL (ref 0.1–1)
BUN SERPL-MCNC: 13 MG/DL (ref 6–20)
CALCIUM SERPL-MCNC: 9 MG/DL (ref 8.7–10.5)
CHLORIDE SERPL-SCNC: 101 MMOL/L (ref 95–110)
CHOLEST SERPL-MCNC: 159 MG/DL (ref 120–199)
CHOLEST/HDLC SERPL: 2.6 {RATIO} (ref 2–5)
CO2 SERPL-SCNC: 27 MMOL/L (ref 23–29)
CREAT SERPL-MCNC: 0.8 MG/DL (ref 0.5–1.4)
EST. GFR  (AFRICAN AMERICAN): >60 ML/MIN/1.73 M^2
EST. GFR  (NON AFRICAN AMERICAN): >60 ML/MIN/1.73 M^2
ESTIMATED AVG GLUCOSE: 137 MG/DL (ref 68–131)
GLUCOSE SERPL-MCNC: 131 MG/DL (ref 70–110)
HBA1C MFR BLD: 6.4 % (ref 4–5.6)
HDLC SERPL-MCNC: 62 MG/DL (ref 40–75)
HDLC SERPL: 39 % (ref 20–50)
LDLC SERPL CALC-MCNC: 89.6 MG/DL (ref 63–159)
NONHDLC SERPL-MCNC: 97 MG/DL
POTASSIUM SERPL-SCNC: 3.9 MMOL/L (ref 3.5–5.1)
PROT SERPL-MCNC: 7.1 G/DL (ref 6–8.4)
SODIUM SERPL-SCNC: 136 MMOL/L (ref 136–145)
TRIGL SERPL-MCNC: 37 MG/DL (ref 30–150)
TSH SERPL DL<=0.005 MIU/L-ACNC: 1.5 UIU/ML (ref 0.4–4)

## 2022-07-27 PROCEDURE — 83036 HEMOGLOBIN GLYCOSYLATED A1C: CPT | Performed by: GENERAL ACUTE CARE HOSPITAL

## 2022-07-27 PROCEDURE — 80053 COMPREHEN METABOLIC PANEL: CPT | Performed by: GENERAL ACUTE CARE HOSPITAL

## 2022-07-27 PROCEDURE — 80061 LIPID PANEL: CPT | Performed by: GENERAL ACUTE CARE HOSPITAL

## 2022-07-27 PROCEDURE — 84443 ASSAY THYROID STIM HORMONE: CPT | Performed by: GENERAL ACUTE CARE HOSPITAL

## 2022-07-27 PROCEDURE — 36415 COLL VENOUS BLD VENIPUNCTURE: CPT | Performed by: GENERAL ACUTE CARE HOSPITAL

## 2022-08-23 NOTE — PROGRESS NOTES
08/24/2022  Pump Clinic Return Visit    Chief Complaint   Patient presents with    Diabetes     Type 1        HPI:Jamshid Norwood is a 32 y.o. 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 Some lows due to insulin stacking with correction boluses. His lunch ICR was strengthened.    Now he reports having difficulty finding the right bolus pattern for pizza. He does sometimes over correct. Still occasionally giving manual boluses around lunchtime depending on how busy he is. Has some difficulty estimating carbs when cafeteria at the hospital changes food options.    Pump Settings   Basal Rate              MN  -  4A:        0.450 U/hr              4A   -  12P:      0.750 U/hr              12P   -  4P:      0.850 U/hr              4P   -  8P:        0.900 U/hr              8P   -  10P:      0.850 U/hr              10P - MN:        0.700 U/hr     Carb Ratio              MN  -  1030A:   1:8              1030A  - 4P:   1:9 (strengthened from 1:10 last visit)   4P - MN:  1:11                ISF              MN  -  12P:      1:40              12P -  MN:       1:50                 Target: 120 mg/dl              IAT: 3.5 hr                 TDD 40.5 units              Basal 41%; Bolus 59%              Sensor wear 100% of the time              GMI: 7.3%                 Time in Range:  "61%                 Changes pump sites every 3 days: yes        Lab Results   Component Value Date    HGBA1C 6.4 (H) 07/27/2022       Glucose Monitoring:  Meter/CGM: Dexcom  Sensor and pump report downloaded and reviewed, see report in media tab        Pt is monitoring blood glucose readings 4 times a day.  Needs >100 strips per month related to fluctuations with blood glucose reading, A1c trends, and activity level.     Hypoglycemic Episodes:  Rarely   Since last visit no severe hypoglycemic episodes requiring observer intervention     DKA: none     Screening / DM Complications:  Neuropathy: denies   Last foot exam Most Recent Foot Exam Date: 5/26/2021  Last eye exam Most Recent Eye Exam Date: 6/9/2021;  no laser surgery or DR - due for this  CVD/MI: denies    Lab Results   Component Value Date    TSH 1.497 07/27/2022      No results found for: TTGIGA     Diet/Exercise: 3 meals a day                 BF: low sugar yogurt and protein bar              L: salad and fruit, nuts, sometimes hosp cafeteria lunchtiming bolus difficult at work              D: variable at 5-6pm - carbs fluctuate     Night-time Snacking:  Not unless low sugar     Wt Change: none        A complete 14 point ROS conducted negative except for above      Lab Results   Component Value Date    TSH 1.497 07/27/2022     Vital Signs  /88 (BP Location: Right arm, Patient Position: Sitting, BP Method: Medium (Manual))   Pulse 94   Ht 5' 11" (1.803 m)   Wt 76.8 kg (169 lb 5 oz)   SpO2 98%   BMI 23.61 kg/m²     Physical Exam  Constitutional:       General: He is not in acute distress.  Pulmonary:      Effort: No respiratory distress.   Musculoskeletal:      Right lower leg: No edema.      Left lower leg: No edema.   Neurological:      Mental Status: He is oriented to person, place, and time.     A 1 pack I  Diabetes Management Status    Statin: Not taking  ACE/ARB: Not taking      Screening or Prevention Patient's value Goal Complete/Controlled? "   HgA1C Testing and Control   Lab Results   Component Value Date    HGBA1C 6.4 (H) 07/27/2022      Annually/Less than 7% Yes   Lipid profile : 07/27/2022 Annually Yes   LDL control Lab Results   Component Value Date    LDLCALC 89.6 07/27/2022    Annually/Less than <100 mg/dl  Yes   Nephropathy screening Lab Results   Component Value Date    LABMICR 5.0 07/27/2022     Lab Results   Component Value Date    PROTEINUA Negative 07/02/2021    Annually Yes   Blood pressure BP Readings from Last 1 Encounters:   01/19/22 (!) 124/26    Less than 140/90 Yes   Dilated retinal exam : 06/09/2021 Annually Yes   Foot exam   : 05/26/2021 Annually Yes        Lab Results   Component Value Date    HGBA1C 6.4 (H) 07/27/2022    HGBA1C 6.1 (H) 07/02/2021    HGBA1C 7.0 (H) 11/04/2020         Chemistry        Component Value Date/Time     07/27/2022 0810    K 3.9 07/27/2022 0810     07/27/2022 0810    CO2 27 07/27/2022 0810    BUN 13 07/27/2022 0810    CREATININE 0.8 07/27/2022 0810     (H) 07/27/2022 0810        Component Value Date/Time    CALCIUM 9.0 07/27/2022 0810    ALKPHOS 72 07/27/2022 0810    AST 25 07/27/2022 0810    ALT 33 07/27/2022 0810    BILITOT 1.3 (H) 07/27/2022 0810    ESTGFRAFRICA >60.0 07/27/2022 0810    EGFRNONAA >60.0 07/27/2022 0810           Lab Results   Component Value Date    MICALBCREAT 2.5 07/27/2022     Lab Results   Component Value Date    CHOL 159 07/27/2022    CHOL 140 07/02/2021    CHOL 156 11/04/2020     Lab Results   Component Value Date    HDL 62 07/27/2022    HDL 54 07/02/2021    HDL 66 11/04/2020     Lab Results   Component Value Date    LDLCALC 89.6 07/27/2022    LDLCALC 78.8 07/02/2021    LDLCALC 81.0 11/04/2020     Lab Results   Component Value Date    TRIG 37 07/27/2022    TRIG 36 07/02/2021    TRIG 45 11/04/2020     Lab Results   Component Value Date    CHOLHDL 39.0 07/27/2022    CHOLHDL 38.6 07/02/2021    CHOLHDL 42.3 11/04/2020           Assessment/Plan  Insulin pump in  place   Continue Omnipod pump and Dexcom   Will send Rx for Omnipod5   Change infusion site every 3 days      Type 1 diabetes  Some overcorrecting for lows leading to highs - reviewed management of lows  Also discussed options for heavier meals, like pizza - listed below  He is not giving as many manual boluses which is what was discussed last visit  No pump changes today  Due for eye exam - he will make appt  Also due for foot exam - will do next visit    Pump Settings   Basal Rate              MN  -  4A:        0.450 U/hr              4A   -  12P:      0.750 U/hr              12P   -  4P:      0.850 U/hr              4P   -  8P:        0.900 U/hr              8P   -  10P:      0.850 U/hr              10P - MN:        0.700 U/hr     Carb Ratio              MN  -  1030A:   1:8              1030A  - 4P:   1:9    4P - MN:  1:11                ISF              MN  -  12P:      1:40              12P -  MN:       1:50                 Target: 120 mg/dl              IAT: 3.5 hr      Patient Instructions   Work on finding something with 15g carbs for correction     For pizza or heavier meals - can try either   1. Add 30% more carbs to the bolus OR  2. 70/30 extended bolus    We will send Rx to upgrade to Omnipod5 - you can either come in for training on the new pod or you can use online training modules to set it up yourself. You will need to use the PDM until it is compatible with iphone.    F/u in 3mo with a1c before          FOLLOW UP  Follow up in about 3 months (around 11/24/2022).       Pump backup plan  If the insulin pump is non functional and discontinued for anticipated more than 20 hours, please give daily injections of:  Long acting insulin 20  units daily  Short acting insulin for meals according to carb ratios and sensitivity factor in the pump.    When the insulin pump is restarted, do not restart basal rates until at least 22 hours after the last long acting insulin injection. You can set a 0% temporary  basal setting that will last until this time and use your pump to bolus for meals and correction.    For any technical insulin pump issues, please contact the insulin pump company; the toll free number is printed on the label on the back of the insulin pump.          Belem Scott MD  [facility], 6th Floor  [address] [place], [address]    Office: [phone]  Fax: [phone]

## 2022-08-24 ENCOUNTER — PATIENT OUTREACH (OUTPATIENT)
Dept: ENDOCRINOLOGY | Facility: CLINIC | Age: 32
End: 2022-08-24
Payer: COMMERCIAL

## 2022-08-24 ENCOUNTER — OFFICE VISIT (OUTPATIENT)
Dept: ENDOCRINOLOGY | Facility: CLINIC | Age: 32
End: 2022-08-24
Payer: COMMERCIAL

## 2022-08-24 VITALS
DIASTOLIC BLOOD PRESSURE: 88 MMHG | BODY MASS INDEX: 23.7 KG/M2 | HEART RATE: 94 BPM | HEIGHT: 71 IN | SYSTOLIC BLOOD PRESSURE: 118 MMHG | OXYGEN SATURATION: 98 % | WEIGHT: 169.31 LBS

## 2022-08-24 DIAGNOSIS — Z96.41 INSULIN PUMP IN PLACE: ICD-10-CM

## 2022-08-24 DIAGNOSIS — E10.9 TYPE 1 DIABETES MELLITUS WITHOUT COMPLICATION: Primary | ICD-10-CM

## 2022-08-24 PROCEDURE — 95251 CONT GLUC MNTR ANALYSIS I&R: CPT | Mod: S$GLB,,, | Performed by: INTERNAL MEDICINE

## 2022-08-24 PROCEDURE — 99999 PR PBB SHADOW E&M-EST. PATIENT-LVL IV: CPT | Mod: PBBFAC,,,

## 2022-08-24 PROCEDURE — 1160F PR REVIEW ALL MEDS BY PRESCRIBER/CLIN PHARMACIST DOCUMENTED: ICD-10-PCS | Mod: CPTII,S$GLB,, | Performed by: INTERNAL MEDICINE

## 2022-08-24 PROCEDURE — 3008F PR BODY MASS INDEX (BMI) DOCUMENTED: ICD-10-PCS | Mod: CPTII,S$GLB,, | Performed by: INTERNAL MEDICINE

## 2022-08-24 PROCEDURE — 1159F MED LIST DOCD IN RCRD: CPT | Mod: CPTII,S$GLB,, | Performed by: INTERNAL MEDICINE

## 2022-08-24 PROCEDURE — 95251 PR GLUCOSE MONITOR, 72 HOUR, PHYS INTERP: ICD-10-PCS | Mod: S$GLB,,, | Performed by: INTERNAL MEDICINE

## 2022-08-24 PROCEDURE — 3066F NEPHROPATHY DOC TX: CPT | Mod: CPTII,S$GLB,, | Performed by: INTERNAL MEDICINE

## 2022-08-24 PROCEDURE — 3074F SYST BP LT 130 MM HG: CPT | Mod: CPTII,S$GLB,, | Performed by: INTERNAL MEDICINE

## 2022-08-24 PROCEDURE — 3008F BODY MASS INDEX DOCD: CPT | Mod: CPTII,S$GLB,, | Performed by: INTERNAL MEDICINE

## 2022-08-24 PROCEDURE — 1159F PR MEDICATION LIST DOCUMENTED IN MEDICAL RECORD: ICD-10-PCS | Mod: CPTII,S$GLB,, | Performed by: INTERNAL MEDICINE

## 2022-08-24 PROCEDURE — 99214 OFFICE O/P EST MOD 30 MIN: CPT | Mod: 25,S$GLB,, | Performed by: INTERNAL MEDICINE

## 2022-08-24 PROCEDURE — 3044F PR MOST RECENT HEMOGLOBIN A1C LEVEL <7.0%: ICD-10-PCS | Mod: CPTII,S$GLB,, | Performed by: INTERNAL MEDICINE

## 2022-08-24 PROCEDURE — 3066F PR DOCUMENTATION OF TREATMENT FOR NEPHROPATHY: ICD-10-PCS | Mod: CPTII,S$GLB,, | Performed by: INTERNAL MEDICINE

## 2022-08-24 PROCEDURE — 3061F NEG MICROALBUMINURIA REV: CPT | Mod: CPTII,S$GLB,, | Performed by: INTERNAL MEDICINE

## 2022-08-24 PROCEDURE — 1160F RVW MEDS BY RX/DR IN RCRD: CPT | Mod: CPTII,S$GLB,, | Performed by: INTERNAL MEDICINE

## 2022-08-24 PROCEDURE — 3079F PR MOST RECENT DIASTOLIC BLOOD PRESSURE 80-89 MM HG: ICD-10-PCS | Mod: CPTII,S$GLB,, | Performed by: INTERNAL MEDICINE

## 2022-08-24 PROCEDURE — 3072F PR LOW RISK FOR RETINOPATHY: ICD-10-PCS | Mod: CPTII,S$GLB,, | Performed by: INTERNAL MEDICINE

## 2022-08-24 PROCEDURE — 3074F PR MOST RECENT SYSTOLIC BLOOD PRESSURE < 130 MM HG: ICD-10-PCS | Mod: CPTII,S$GLB,, | Performed by: INTERNAL MEDICINE

## 2022-08-24 PROCEDURE — 3079F DIAST BP 80-89 MM HG: CPT | Mod: CPTII,S$GLB,, | Performed by: INTERNAL MEDICINE

## 2022-08-24 PROCEDURE — 3044F HG A1C LEVEL LT 7.0%: CPT | Mod: CPTII,S$GLB,, | Performed by: INTERNAL MEDICINE

## 2022-08-24 PROCEDURE — 3061F PR NEG MICROALBUMINURIA RESULT DOCUMENTED/REVIEW: ICD-10-PCS | Mod: CPTII,S$GLB,, | Performed by: INTERNAL MEDICINE

## 2022-08-24 PROCEDURE — 99214 PR OFFICE/OUTPT VISIT, EST, LEVL IV, 30-39 MIN: ICD-10-PCS | Mod: 25,S$GLB,, | Performed by: INTERNAL MEDICINE

## 2022-08-24 PROCEDURE — 99999 PR PBB SHADOW E&M-EST. PATIENT-LVL IV: ICD-10-PCS | Mod: PBBFAC,,,

## 2022-08-24 PROCEDURE — 3072F LOW RISK FOR RETINOPATHY: CPT | Mod: CPTII,S$GLB,, | Performed by: INTERNAL MEDICINE

## 2022-08-24 RX ORDER — INSULIN PMP CART,AUT,G6/7,CNTR
1 EACH SUBCUTANEOUS CONTINUOUS
Qty: 1 EACH | Refills: 0 | Status: SHIPPED | OUTPATIENT
Start: 2022-08-24

## 2022-08-24 RX ORDER — INSULIN PMP CART,AUT,G6/7,CNTR
1 EACH SUBCUTANEOUS
Qty: 30 EACH | Refills: 3 | Status: SHIPPED | OUTPATIENT
Start: 2022-08-24 | End: 2023-09-07 | Stop reason: SDUPTHER

## 2022-08-24 NOTE — PATIENT INSTRUCTIONS
Work on finding something with 15g carbs for correction     For pizza or heavier meals - can try either   Add 30% more carbs to the bolus OR  70/30 extended bolus    We will send Rx to upgrade to Omnipod5 - you can either come in for training on the new pod or you can use online training modules to set it up yourself. You will need to use the PDM until it is compatible with iphone.    F/u in 3mo with a1c before

## 2022-08-24 NOTE — ASSESSMENT & PLAN NOTE
Some overcorrecting for lows leading to highs - reviewed management of lows  Also discussed options for heavier meals, like pizza - listed below  He is not giving as many manual boluses which is what was discussed last visit  No pump changes today  Due for eye exam - he will make appt  Also due for foot exam - will do next visit    Pump Settings   Basal Rate              MN  -  4A:        0.450 U/hr              4A   -  12P:      0.750 U/hr              12P   -  4P:      0.850 U/hr              4P   -  8P:        0.900 U/hr              8P   -  10P:      0.850 U/hr              10P - MN:        0.700 U/hr     Carb Ratio              MN  -  1030A:   1:8              1030A  - 4P:   1:9    4P - MN:  1:11                ISF              MN  -  12P:      1:40              12P -  MN:       1:50                 Target: 120 mg/dl              IAT: 3.5 hr      Patient Instructions   Work on finding something with 15g carbs for correction     For pizza or heavier meals - can try either   1. Add 30% more carbs to the bolus OR  2. 70/30 extended bolus    We will send Rx to upgrade to Omnipod5 - you can either come in for training on the new pod or you can use online training modules to set it up yourself. You will need to use the PDM until it is compatible with iphone.    F/u in 3mo with a1c before

## 2022-08-24 NOTE — ASSESSMENT & PLAN NOTE
Continue Omnipod pump and Dexcom   Will send Rx for Omnipod5   Change infusion site every 3 days

## 2022-08-24 NOTE — PROGRESS NOTES
DIABETES EDUCATION NOTE  08/24/2022     Jamshid Norwood was seen by me in the multidisciplinary insulin pump clinic and the following topics were addressed:    Pump/CGM site concerns?  [x] No  [] Yes    Can recognize/manage pump  [] No  [x] Yes  Malfunction?    Meal patterns:    Specific diet? Low carb   Meals/day 2-3   Snacks  occasionally   Carb counting yes    Appropriate correction   [] No  [x] Yes  of lows/highs?    Ability to upload data from home? [] No  [x] Yes     Patient seen in pump clinic today.  Pump and CGM download were evaluated.  Covered back up insulin plan with patient in the event of pump failure/lack of supplies.    Advised to stay current with pump supply reorders. Reviewed site placement rotation and frequency of site changes.     Patient was interested in the Omnipod 5.  Will send in info regarding.

## 2022-08-24 NOTE — PROGRESS NOTES
I have reviewed and concur with Dr. Scott's history, physical, assessment, and plan.  I have personally interviewed and examined the patient.        Derrek De Los Santos MD

## 2022-09-13 NOTE — PROGRESS NOTES
DIABETES EDUCATION NOTE  08/24/2022    Jamshid Norwood was seen by me in the multidisciplinary insulin pump clinic and the following topics were addressed:    Pump/CGM site concerns?  [x] No  [] Yes    Can recognize/manage pump  [] No  [] Yes  Malfunction?    Meal patterns:    Specific diet? Low carb   Meals/day 2-3   Snacks  occasionally   Carb counting yes    Appropriate correction   [] No  [x] Yes  of lows/highs?    Ability to upload data from home? [] No  [] Yes     Patient seen in pump clinic today.  Pump and CGM download were evaluated.  Covered back up insulin plan with patient in the event of pump failure/lack of supplies.    Advised to stay current with pump supply reorders. Reviewed site placement rotation and frequency of site changes.

## 2022-11-30 ENCOUNTER — LAB VISIT (OUTPATIENT)
Dept: LAB | Facility: HOSPITAL | Age: 32
End: 2022-11-30
Payer: COMMERCIAL

## 2022-11-30 ENCOUNTER — OFFICE VISIT (OUTPATIENT)
Dept: ENDOCRINOLOGY | Facility: CLINIC | Age: 32
End: 2022-11-30
Payer: COMMERCIAL

## 2022-11-30 VITALS
BODY MASS INDEX: 25.17 KG/M2 | DIASTOLIC BLOOD PRESSURE: 78 MMHG | WEIGHT: 179.81 LBS | SYSTOLIC BLOOD PRESSURE: 122 MMHG | OXYGEN SATURATION: 96 % | HEART RATE: 95 BPM | HEIGHT: 71 IN

## 2022-11-30 DIAGNOSIS — E10.9 TYPE 1 DIABETES MELLITUS WITHOUT COMPLICATION: ICD-10-CM

## 2022-11-30 DIAGNOSIS — Z96.41 INSULIN PUMP IN PLACE: ICD-10-CM

## 2022-11-30 LAB
ESTIMATED AVG GLUCOSE: 128 MG/DL (ref 68–131)
HBA1C MFR BLD: 6.1 % (ref 4–5.6)

## 2022-11-30 PROCEDURE — 3078F PR MOST RECENT DIASTOLIC BLOOD PRESSURE < 80 MM HG: ICD-10-PCS | Mod: CPTII,S$GLB,, | Performed by: INTERNAL MEDICINE

## 2022-11-30 PROCEDURE — 1160F RVW MEDS BY RX/DR IN RCRD: CPT | Mod: CPTII,S$GLB,, | Performed by: INTERNAL MEDICINE

## 2022-11-30 PROCEDURE — 1159F MED LIST DOCD IN RCRD: CPT | Mod: CPTII,S$GLB,, | Performed by: INTERNAL MEDICINE

## 2022-11-30 PROCEDURE — 3044F PR MOST RECENT HEMOGLOBIN A1C LEVEL <7.0%: ICD-10-PCS | Mod: CPTII,S$GLB,, | Performed by: INTERNAL MEDICINE

## 2022-11-30 PROCEDURE — 3066F NEPHROPATHY DOC TX: CPT | Mod: CPTII,S$GLB,, | Performed by: INTERNAL MEDICINE

## 2022-11-30 PROCEDURE — 95251 CONT GLUC MNTR ANALYSIS I&R: CPT | Mod: S$GLB,,, | Performed by: STUDENT IN AN ORGANIZED HEALTH CARE EDUCATION/TRAINING PROGRAM

## 2022-11-30 PROCEDURE — 3044F HG A1C LEVEL LT 7.0%: CPT | Mod: CPTII,S$GLB,, | Performed by: INTERNAL MEDICINE

## 2022-11-30 PROCEDURE — 3008F BODY MASS INDEX DOCD: CPT | Mod: CPTII,S$GLB,, | Performed by: INTERNAL MEDICINE

## 2022-11-30 PROCEDURE — 3072F LOW RISK FOR RETINOPATHY: CPT | Mod: CPTII,S$GLB,, | Performed by: INTERNAL MEDICINE

## 2022-11-30 PROCEDURE — 83036 HEMOGLOBIN GLYCOSYLATED A1C: CPT | Performed by: STUDENT IN AN ORGANIZED HEALTH CARE EDUCATION/TRAINING PROGRAM

## 2022-11-30 PROCEDURE — 3072F PR LOW RISK FOR RETINOPATHY: ICD-10-PCS | Mod: CPTII,S$GLB,, | Performed by: INTERNAL MEDICINE

## 2022-11-30 PROCEDURE — 95251 PR GLUCOSE MONITOR, 72 HOUR, PHYS INTERP: ICD-10-PCS | Mod: S$GLB,,, | Performed by: STUDENT IN AN ORGANIZED HEALTH CARE EDUCATION/TRAINING PROGRAM

## 2022-11-30 PROCEDURE — 3066F PR DOCUMENTATION OF TREATMENT FOR NEPHROPATHY: ICD-10-PCS | Mod: CPTII,S$GLB,, | Performed by: INTERNAL MEDICINE

## 2022-11-30 PROCEDURE — 99999 PR PBB SHADOW E&M-EST. PATIENT-LVL IV: ICD-10-PCS | Mod: PBBFAC,,,

## 2022-11-30 PROCEDURE — 99214 PR OFFICE/OUTPT VISIT, EST, LEVL IV, 30-39 MIN: ICD-10-PCS | Mod: 25,S$GLB,, | Performed by: INTERNAL MEDICINE

## 2022-11-30 PROCEDURE — 36415 COLL VENOUS BLD VENIPUNCTURE: CPT | Performed by: STUDENT IN AN ORGANIZED HEALTH CARE EDUCATION/TRAINING PROGRAM

## 2022-11-30 PROCEDURE — 3074F PR MOST RECENT SYSTOLIC BLOOD PRESSURE < 130 MM HG: ICD-10-PCS | Mod: CPTII,S$GLB,, | Performed by: INTERNAL MEDICINE

## 2022-11-30 PROCEDURE — 99999 PR PBB SHADOW E&M-EST. PATIENT-LVL IV: CPT | Mod: PBBFAC,,,

## 2022-11-30 PROCEDURE — 3008F PR BODY MASS INDEX (BMI) DOCUMENTED: ICD-10-PCS | Mod: CPTII,S$GLB,, | Performed by: INTERNAL MEDICINE

## 2022-11-30 PROCEDURE — 3061F NEG MICROALBUMINURIA REV: CPT | Mod: CPTII,S$GLB,, | Performed by: INTERNAL MEDICINE

## 2022-11-30 PROCEDURE — 3074F SYST BP LT 130 MM HG: CPT | Mod: CPTII,S$GLB,, | Performed by: INTERNAL MEDICINE

## 2022-11-30 PROCEDURE — 3061F PR NEG MICROALBUMINURIA RESULT DOCUMENTED/REVIEW: ICD-10-PCS | Mod: CPTII,S$GLB,, | Performed by: INTERNAL MEDICINE

## 2022-11-30 PROCEDURE — 1159F PR MEDICATION LIST DOCUMENTED IN MEDICAL RECORD: ICD-10-PCS | Mod: CPTII,S$GLB,, | Performed by: INTERNAL MEDICINE

## 2022-11-30 PROCEDURE — 3078F DIAST BP <80 MM HG: CPT | Mod: CPTII,S$GLB,, | Performed by: INTERNAL MEDICINE

## 2022-11-30 PROCEDURE — 1160F PR REVIEW ALL MEDS BY PRESCRIBER/CLIN PHARMACIST DOCUMENTED: ICD-10-PCS | Mod: CPTII,S$GLB,, | Performed by: INTERNAL MEDICINE

## 2022-11-30 PROCEDURE — 99214 OFFICE O/P EST MOD 30 MIN: CPT | Mod: 25,S$GLB,, | Performed by: INTERNAL MEDICINE

## 2022-11-30 RX ORDER — INSULIN ASPART 100 [IU]/ML
INJECTION, SOLUTION INTRAVENOUS; SUBCUTANEOUS
Qty: 2 EACH | Refills: 8 | Status: SHIPPED | OUTPATIENT
Start: 2022-11-30 | End: 2023-10-30 | Stop reason: SDUPTHER

## 2022-11-30 NOTE — PATIENT INSTRUCTIONS
Work on timing of lunch bolus. If still having highs despite better timing, would strengthen carb ratio from 11 --> 9.    We increased your target o/n and strengthened dinner CR today.    F/u in 3mo with a1c before     Pump Settings (changes in bold)  Basal Rate              MN  -  4A:        0.450 U/hr              4A   -  12P:      0.750 U/hr              12P   -  4P:      0.850 U/hr              4P   -  8P:        0.900 U/hr              8P   -  10P:      0.850 U/hr              10P - MN:        0.700 U/hr     Carb Ratio              MN  -  1030A:   1:8              1030A  - 4P:      1:9               4P - MN:           1:9 (strengthened from 1:11)                ISF              MN  -  12P:      1:40              12P -  MN:       1:50                 Target: 120 mg/dl overnight beginning at midnight, 110 mg/dL 6A-12A              IAT: 3.5 hr

## 2022-11-30 NOTE — PROGRESS NOTES
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 Since then he has upgraded to Omnipod 5 (from Omnipod Dash). He states he likes some things about OP5 better but also gets frustrated with it at times.    Not always able to bolus 15min before lunch, often 5-10min before when walking to lunch after last morning appointment. He feels current ICR works but takes a long time to work but then sometimes goes low before lunch. Notices that basal often turns off o/n, wakes up low and then goes high after breakfast. Had issue with site on 11/20, persistent highs - improved with site change.    Diagnosed at age 10 (2001)    Known complications: possible retinopathy although changes may be related to previous trauma to R eye    Current diabetes regimen:  Pump: Omnipod 5 and CIQ with Novolog insulin    Pump Settings  Basal Rate              MN  -  4A:        0.450 U/hr              4A   -  12P:      0.750 U/hr              12P   -  4P:      0.850 U/hr              4P   -  8P:        0.900 U/hr              8P   -  10P:      0.850 U/hr              10P - MN:        0.700 U/hr     Carb Ratio              MN  -  1030A:   1:8              1030A  - 4P:      1:9               4P - MN:           1:11                ISF              MN  -  12P:      1:40              12P -  MN:       1:50                 Target: 110 mg/dl               IAT: 3.5 hr    TDD 38.9u  Basal 56%; Bolus 44%      Lab Results   Component Value Date    HGBA1C 6.4 (H) 07/27/2022       Glucagon: does not have it, will send Baqsimi Rx today  Back-up basal insulin: has Lantus    Glucose Monitoring:  Meter/CGM: Dexcom G6 - CGM/pump data reviewed. Report in media tab.      Hypoglycemic Episodes:  Wakes up low sometimes - drifts down.     DKA: denies      Diet/Exercise:  Eats 3 meals/day - breakfast around 7am during the week  "and 9am or so on weekends, lunch midday - -12p-1p, and dinner 5-7pm  Not much exercise     Screening / DM Complications:  Neuropathy: denies  Last foot exam : 05/26/2021  Last eye exam : 06/09/2021;  no laser surgery or DR - has an eye dr and is due for repeat visit  CVD/MI: no  Nephropathy: no  Lab Results   Component Value Date    MICALBCREAT 2.5 07/27/2022         Lab Results   Component Value Date    TSH 1.497 07/27/2022     No results found for: TTGIGA      Lipids: not on statin  Lab Results   Component Value Date    CHOL 159 07/27/2022    TRIG 37 07/27/2022    HDL 62 07/27/2022    LDLCALC 89.6 07/27/2022    CHOLHDL 39.0 07/27/2022         Current Outpatient Medications:     blood sugar diagnostic Strp, To check BG 8 times daily, to use with insurance preferred meter, Disp: 600 strip, Rfl: 3    blood-glucose transmitter (DEXCOM G6 TRANSMITTER) Shannon, USE AS DIRECTED, Disp: 1 each, Rfl: 3    DEXCOM G6 SENSOR Shannon, USE AS DIRECTED, CHANGE SENSOR EVERY 10 DAYS, Disp: 9 each, Rfl: 3    insulin (LANTUS SOLOSTAR U-100 INSULIN) glargine 100 units/mL (3mL) SubQ pen, INJECT 20 UNITS SUBCUTANEOUSLY ONCE DAILY IN EVENT OF PUMP FAILURE, Disp: 15 mL, Rfl: 1    insulin pump cart,auto,BT-cntr (OMNIPOD 5 G6 INTRO KIT, GEN 5,) Crtg, Change pod every 3 days, Disp: 1 each, Rfl: 0    insulin pump cart,automated,BT (OMNIPOD 5 G6 PODS, GEN 5,) Crtg, Inject 1 each into the skin Every 3 (three) days., Disp: 30 each, Rfl: 3    NOVOLOG U-100 INSULIN ASPART 100 unit/mL injection, Use 200 units in omnipod pump every 3 days, Disp: 2 each, Rfl: 8    OMNIPOD DASH INSULIN POD Crtg, USE AS DIRECTED . CHANGE EVERY 3 DAYS, Disp: 6 each, Rfl: 3    pen needle, diabetic (BD ULTRA-FINE DAMIR PEN NEEDLE) 32 gauge x 5/32" Ndle, 1 each by Misc.(Non-Drug; Combo Route) route once daily., Disp: 30 each, Rfl: 3    ROS as above    Objective:     Vitals:    11/30/22 0837   BP: 122/78   Pulse: 95     Wt Readings from Last 3 Encounters:   11/30/22 81.5 kg (179 " lb 12.6 oz)   08/24/22 76.8 kg (169 lb 5 oz)   01/19/22 73.6 kg (162 lb 5.9 oz)     Body mass index is 25.07 kg/m².  Physical Exam  Constitutional:       General: He is not in acute distress.  HENT:      Mouth/Throat:      Mouth: Mucous membranes are moist.   Cardiovascular:      Rate and Rhythm: Normal rate.   Pulmonary:      Effort: No respiratory distress.   Abdominal:      Palpations: Abdomen is soft.   Musculoskeletal:      Right lower leg: No edema.      Left lower leg: No edema.   Neurological:      Mental Status: He is oriented to person, place, and time.   Psychiatric:         Mood and Affect: Mood normal.         Behavior: Behavior normal.     Protective Sensation (w/ 10 gram monofilament):  Right: Intact  Left: Intact    Visual Inspection:  Normal -  Bilateral    Pedal Pulses:   Right: Present  Left: Present    Posterior tibialis:   Right:Present  Left: Present      LABS    Chemistry        Component Value Date/Time     07/27/2022 0810    K 3.9 07/27/2022 0810     07/27/2022 0810    CO2 27 07/27/2022 0810    BUN 13 07/27/2022 0810    CREATININE 0.8 07/27/2022 0810     (H) 07/27/2022 0810        Component Value Date/Time    CALCIUM 9.0 07/27/2022 0810    ALKPHOS 72 07/27/2022 0810    AST 25 07/27/2022 0810    ALT 33 07/27/2022 0810    BILITOT 1.3 (H) 07/27/2022 0810    ESTGFRAFRICA >60.0 07/27/2022 0810    EGFRNONAA >60.0 07/27/2022 0810              Assessment and Plan     Problem List Items Addressed This Visit          Endocrine    Type 1 diabetes     Overall good control of T1DM with TIR 69% and a1c 6.4%  Frequently o/n pump suspends basal - drifting down, then has difficulty with post-breakfast highs. Will increase target overnight   Lunch highs as well - but Jamshid thinks timing is the issue, as he often boluses not long before eating, and although high initially after lunch, the bolus does bring him back down later   Will not change lunch CR for now - but if he is noticing highs  despite improving timing, he will strengthen the lunch CR as well   Today I am refilling Novolog and sending Rx for Baqsimi  He is due for eye appt - he will schedule an appt (he has an eye dr)  Foot exam today   F/u in 3mo with a1c before     Pump Settings (changes in bold)  Basal Rate              MN  -  4A:        0.450 U/hr              4A   -  12P:      0.750 U/hr              12P   -  4P:      0.850 U/hr              4P   -  8P:        0.900 U/hr              8P   -  10P:      0.850 U/hr              10P - MN:        0.700 U/hr     Carb Ratio              MN  -  1030A:   1:8              1030A  - 4P:      1:9               4P - MN:           1:9 (strengthened from 1:11)                ISF              MN  -  12P:      1:40              12P -  MN:       1:50                 Target: 120 mg/dl overnight beginning at midnight, 110 mg/dL 6A-12A              IAT: 3.5 hr         Relevant Medications    NOVOLOG U-100 INSULIN ASPART 100 unit/mL injection    Other Relevant Orders    Hemoglobin A1C    Insulin pump in place     Omnipod 5 with Novolog and Dexcom   Switched from Omnipod Dash to OP5 in 9/2022  No issues with pump, site, or CGM             RTC 3mo with a1c before     Pump backup plan    If the insulin pump is non functional and discontinued for anticipated more than 20 hours, please give daily injections of:  Long acting insulin: Lantus 20 units daily  Short acting insulin:  Novolog with carb ratio of 1 unit per 9 grams and correction 1 unit per 50 mg/dL    When the insulin pump is restarted, do not restart basal rates until at least 22 hours after the last long acting insulin injection. You can set a 0% temporary basal setting that will last until this time and use your pump to bolus for meals and correction.    For any technical insulin pump issues, please contact the insulin pump company; the toll free number is printed on the label on the back of the insulin pump.      Sarah Sternlieb, MD [facility], 6th Floor  [address] [place], [address]    Office: [phone]  Fax: [phone]

## 2022-11-30 NOTE — ASSESSMENT & PLAN NOTE
Overall good control of T1DM with TIR 69% and a1c 6.4%  Frequently o/n pump suspends basal - drifting down, then has difficulty with post-breakfast highs. Will increase target overnight   Lunch highs as well - but Jamshid thinks timing is the issue, as he often boluses not long before eating, and although high initially after lunch, the bolus does bring him back down later   Will not change lunch CR for now - but if he is noticing highs despite improving timing, he will strengthen the lunch CR as well   Today I am refilling Novolog and sending Rx for Baqsimi  He is due for eye appt - he will schedule an appt (he has an eye dr)  Foot exam today   F/u in 3mo with a1c before     Pump Settings (changes in bold)  Basal Rate              MN  -  4A:        0.450 U/hr              4A   -  12P:      0.750 U/hr              12P   -  4P:      0.850 U/hr              4P   -  8P:        0.900 U/hr              8P   -  10P:      0.850 U/hr              10P - MN:        0.700 U/hr     Carb Ratio              MN  -  1030A:   1:8              1030A  - 4P:      1:9               4P - MN:           1:9 (strengthened from 1:11)                ISF              MN  -  12P:      1:40              12P -  MN:       1:50                 Target: 120 mg/dl overnight beginning at midnight, 110 mg/dL 6A-12A              IAT: 3.5 hr

## 2022-11-30 NOTE — ASSESSMENT & PLAN NOTE
Omnipod 5 with Novolog and Dexcom   Switched from Omnipod Dash to OP5 in 9/2022  No issues with pump, site, or CGM

## 2022-11-30 NOTE — Clinical Note
Patrick, Can you please get him scheduled for 3mo pump f/u with a1c before? I realized I did not sign the orders until now. Thank you! Belem

## 2023-01-13 ENCOUNTER — PATIENT MESSAGE (OUTPATIENT)
Dept: ENDOCRINOLOGY | Facility: CLINIC | Age: 33
End: 2023-01-13
Payer: COMMERCIAL

## 2023-01-19 ENCOUNTER — PATIENT MESSAGE (OUTPATIENT)
Dept: ENDOCRINOLOGY | Facility: CLINIC | Age: 33
End: 2023-01-19
Payer: COMMERCIAL

## 2023-01-19 DIAGNOSIS — E10.9 TYPE 1 DIABETES MELLITUS WITHOUT COMPLICATION: ICD-10-CM

## 2023-01-20 RX ORDER — BLOOD-GLUCOSE SENSOR
EACH MISCELLANEOUS
Qty: 9 EACH | Refills: 3 | Status: SHIPPED | OUTPATIENT
Start: 2023-01-20 | End: 2023-01-23 | Stop reason: SDUPTHER

## 2023-01-20 RX ORDER — BLOOD-GLUCOSE TRANSMITTER
EACH MISCELLANEOUS
Qty: 1 EACH | Refills: 3 | Status: SHIPPED | OUTPATIENT
Start: 2023-01-20 | End: 2023-01-23 | Stop reason: SDUPTHER

## 2023-01-23 ENCOUNTER — PATIENT MESSAGE (OUTPATIENT)
Dept: ENDOCRINOLOGY | Facility: CLINIC | Age: 33
End: 2023-01-23
Payer: COMMERCIAL

## 2023-01-23 DIAGNOSIS — E10.9 TYPE 1 DIABETES MELLITUS WITHOUT COMPLICATION: ICD-10-CM

## 2023-01-23 RX ORDER — BLOOD-GLUCOSE TRANSMITTER
EACH MISCELLANEOUS
Qty: 1 EACH | Refills: 3 | Status: SHIPPED | OUTPATIENT
Start: 2023-01-23 | End: 2024-03-02 | Stop reason: SDUPTHER

## 2023-01-23 RX ORDER — BLOOD-GLUCOSE SENSOR
EACH MISCELLANEOUS
Qty: 9 EACH | Refills: 3 | Status: SHIPPED | OUTPATIENT
Start: 2023-01-23 | End: 2024-03-02 | Stop reason: SDUPTHER

## 2023-03-23 ENCOUNTER — TELEPHONE (OUTPATIENT)
Dept: PHARMACY | Facility: CLINIC | Age: 33
End: 2023-03-23
Payer: COMMERCIAL

## 2023-03-24 ENCOUNTER — TELEPHONE (OUTPATIENT)
Dept: ENDOCRINOLOGY | Facility: CLINIC | Age: 33
End: 2023-03-24
Payer: COMMERCIAL

## 2023-03-24 NOTE — TELEPHONE ENCOUNTER
Called to inform patient that his Dexcom G6 was approved and is ready for pickup at [facility].

## 2023-06-01 ENCOUNTER — PATIENT MESSAGE (OUTPATIENT)
Dept: ENDOCRINOLOGY | Facility: CLINIC | Age: 33
End: 2023-06-01
Payer: COMMERCIAL

## 2023-09-07 DIAGNOSIS — E10.9 TYPE 1 DIABETES MELLITUS WITHOUT COMPLICATION: Primary | ICD-10-CM

## 2023-09-08 RX ORDER — INSULIN PMP CART,AUT,G6/7,CNTR
1 EACH SUBCUTANEOUS
Qty: 30 EACH | Refills: 3 | Status: SHIPPED | OUTPATIENT
Start: 2023-09-08

## 2023-09-20 ENCOUNTER — PATIENT OUTREACH (OUTPATIENT)
Dept: DIABETES | Facility: CLINIC | Age: 33
End: 2023-09-20
Payer: COMMERCIAL

## 2023-09-20 ENCOUNTER — OFFICE VISIT (OUTPATIENT)
Dept: ENDOCRINOLOGY | Facility: CLINIC | Age: 33
End: 2023-09-20
Payer: COMMERCIAL

## 2023-09-20 VITALS
HEIGHT: 71 IN | SYSTOLIC BLOOD PRESSURE: 120 MMHG | RESPIRATION RATE: 18 BRPM | BODY MASS INDEX: 26.79 KG/M2 | OXYGEN SATURATION: 98 % | HEART RATE: 88 BPM | WEIGHT: 191.38 LBS | DIASTOLIC BLOOD PRESSURE: 86 MMHG

## 2023-09-20 DIAGNOSIS — Z96.41 INSULIN PUMP IN PLACE: Primary | ICD-10-CM

## 2023-09-20 DIAGNOSIS — E10.9 TYPE 1 DIABETES MELLITUS WITHOUT COMPLICATION: ICD-10-CM

## 2023-09-20 PROCEDURE — 95251 PR GLUCOSE MONITOR, 72 HOUR, PHYS INTERP: ICD-10-PCS | Mod: S$GLB,,, | Performed by: STUDENT IN AN ORGANIZED HEALTH CARE EDUCATION/TRAINING PROGRAM

## 2023-09-20 PROCEDURE — 1160F PR REVIEW ALL MEDS BY PRESCRIBER/CLIN PHARMACIST DOCUMENTED: ICD-10-PCS | Mod: CPTII,S$GLB,, | Performed by: INTERNAL MEDICINE

## 2023-09-20 PROCEDURE — 1160F RVW MEDS BY RX/DR IN RCRD: CPT | Mod: CPTII,S$GLB,, | Performed by: INTERNAL MEDICINE

## 2023-09-20 PROCEDURE — 3008F BODY MASS INDEX DOCD: CPT | Mod: CPTII,S$GLB,, | Performed by: INTERNAL MEDICINE

## 2023-09-20 PROCEDURE — 99214 PR OFFICE/OUTPT VISIT, EST, LEVL IV, 30-39 MIN: ICD-10-PCS | Mod: 25,S$GLB,, | Performed by: INTERNAL MEDICINE

## 2023-09-20 PROCEDURE — 99214 OFFICE O/P EST MOD 30 MIN: CPT | Mod: 25,S$GLB,, | Performed by: INTERNAL MEDICINE

## 2023-09-20 PROCEDURE — 3079F DIAST BP 80-89 MM HG: CPT | Mod: CPTII,S$GLB,, | Performed by: INTERNAL MEDICINE

## 2023-09-20 PROCEDURE — 3079F PR MOST RECENT DIASTOLIC BLOOD PRESSURE 80-89 MM HG: ICD-10-PCS | Mod: CPTII,S$GLB,, | Performed by: INTERNAL MEDICINE

## 2023-09-20 PROCEDURE — 99999 PR PBB SHADOW E&M-EST. PATIENT-LVL IV: ICD-10-PCS | Mod: PBBFAC,,,

## 2023-09-20 PROCEDURE — 3074F SYST BP LT 130 MM HG: CPT | Mod: CPTII,S$GLB,, | Performed by: INTERNAL MEDICINE

## 2023-09-20 PROCEDURE — 95251 CONT GLUC MNTR ANALYSIS I&R: CPT | Mod: S$GLB,,, | Performed by: STUDENT IN AN ORGANIZED HEALTH CARE EDUCATION/TRAINING PROGRAM

## 2023-09-20 PROCEDURE — 3074F PR MOST RECENT SYSTOLIC BLOOD PRESSURE < 130 MM HG: ICD-10-PCS | Mod: CPTII,S$GLB,, | Performed by: INTERNAL MEDICINE

## 2023-09-20 PROCEDURE — 3008F PR BODY MASS INDEX (BMI) DOCUMENTED: ICD-10-PCS | Mod: CPTII,S$GLB,, | Performed by: INTERNAL MEDICINE

## 2023-09-20 PROCEDURE — 1159F MED LIST DOCD IN RCRD: CPT | Mod: CPTII,S$GLB,, | Performed by: INTERNAL MEDICINE

## 2023-09-20 PROCEDURE — 99999 PR PBB SHADOW E&M-EST. PATIENT-LVL IV: CPT | Mod: PBBFAC,,,

## 2023-09-20 PROCEDURE — 1159F PR MEDICATION LIST DOCUMENTED IN MEDICAL RECORD: ICD-10-PCS | Mod: CPTII,S$GLB,, | Performed by: INTERNAL MEDICINE

## 2023-09-20 RX ORDER — GLUCAGON 3 MG/1
3 POWDER NASAL
Qty: 1 EACH | Refills: 1 | Status: SHIPPED | OUTPATIENT
Start: 2023-09-20

## 2023-09-20 RX ORDER — INSULIN ASPART INJECTION 100 [IU]/ML
INJECTION, SOLUTION SUBCUTANEOUS
Status: CANCELLED | OUTPATIENT
Start: 2023-09-20

## 2023-09-20 RX ORDER — INSULIN LISPRO-AABC 100 [IU]/ML
INJECTION, SOLUTION INTRAVENOUS; SUBCUTANEOUS
Qty: 10 ML | Refills: 6 | Status: SHIPPED | OUTPATIENT
Start: 2023-09-20 | End: 2023-10-29 | Stop reason: SDUPTHER

## 2023-09-20 RX ORDER — INSULIN GLARGINE 100 [IU]/ML
INJECTION, SOLUTION SUBCUTANEOUS
Qty: 15 ML | Refills: 1 | Status: SHIPPED | OUTPATIENT
Start: 2023-09-20 | End: 2023-09-26 | Stop reason: SDUPTHER

## 2023-09-20 NOTE — PROGRESS NOTES
I have reviewed and concur with Dr. Jaida Hobson's history, physical, assessment, and plan.  I have personally interviewed and examined the patient.  See below addendum for my evaluation and additional findings.    Patient reports that for past couple of weeks he has had higher than normal carbohydrate intake and attributes postprandial hyperglycemia to this.  Pump download reveals consistent postprandial hyperglycemia requiring many correction boluses and at times patients be entering in carbs when not eating carbs in order to bring down blood glucose.  Discussed trying to avoid giving manual boluses or entering in carbs when not consuming carbs.  We have made his evening carb ratio stronger but he prefers to hold off on adjusting lunch carb ratio.  If after 1 week he is still having postprandial hyperglycemia after lunch he will strengthen lunch carb ratio and let us know.  Will also change ISF to 1:40 across the board as he feels that this was working better.  Reports issues with insulin leakage when on fiasp and did not feel that this worked well so will try switching to lyumjev insulin.    Derrek De Los Santos MD

## 2023-09-20 NOTE — PATIENT INSTRUCTIONS
Carb Ratio              MN  -  1030A:   1:8              1030A  - 4P:      1:9               4P - MN:           1:8    Ok to strengthen further if still hyperglycemic over the next few weeks    ISF              MN  -  MN:      1:40

## 2023-09-20 NOTE — PROGRESS NOTES
DIABETES EDUCATION NOTE  09/20/2023     Jamshid Norwood was seen by me in the multidisciplinary insulin pump clinic and the following topics were addressed:    Pump/CGM site concerns?  [x] No  [] Yes    Can recognize/manage pump  [] No  [x] Yes  Malfunction?    Meal patterns:    Specific diet? Low carb   Meals/day 2-3   Snacks  occasionally   Carb counting yes    Appropriate correction   [] No  [x] Yes  of lows/highs?    Ability to upload data from home? [] No  [x] Yes     Patient seen in pump clinic today.  Pump and CGM download were evaluated.  Covered back up insulin plan with patient in the event of pump failure/lack of supplies.    Advised to stay current with pump supply reorders. Reviewed site placement rotation and frequency of site changes.

## 2023-09-20 NOTE — ASSESSMENT & PLAN NOTE
Overall good control of T1DM with TIR 64% - slightly worsened from previous visit  We discussed his diet from last week was very different from his usual diet, so he will observe his trends for the next couple of weeks and make adjustments to his lunch carb ratio based on that.  At this point we will only change his carb ratio for dinner.  Today I am refilling Novolog and sending Rx for Baqsimi, also sending for Lyumjev given patient did not tolerate Fiasp (local burning, side effects)  He is due for eye appt - he will schedule an appt, he'll have to find a new ophtho  Foot exam due next visit  F/u in 3mo with a1c before      Pump Settings (changes in bold)  Basal Rate              MN  -  4A:        0.450 U/hr              4A   -  12P:      0.750 U/hr              12P   -  4P:      0.850 U/hr              4P   -  8P:        0.900 U/hr              8P   -  10P:      0.850 U/hr              10P - MN:        0.700 U/hr     Carb Ratio              MN  -  1030A:    1:8              1030A  - 4P:      1:9               4P - MN:           1:8 (strengthened from 1:9)                ISF              MN  -  12P:      1:40              12P -  MN:       1:50                 Target: 120 mg/dl overnight beginning at midnight, 110 mg/dL 6A-12A              IAT: 3.5 hr

## 2023-09-20 NOTE — ASSESSMENT & PLAN NOTE
Omnipod 5 with Novolog and Dexcom G6  Some issues with site - placed a pod on his leg, didn't work as well. Discussed with diabetes education.

## 2023-09-20 NOTE — PROGRESS NOTES
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 He has upgraded to Omnipod 5 (from Omnipod Dash) about a year ago.     Diagnosed at age 10 (2001)    Known complications: possible retinopathy although changes may be related to previous trauma to R eye    Current diabetes regimen:  Pump: Omnipod 5 and CIQ with Novolog insulin    Upon review of his insulin pump data, it looks like he has been using an increased amount of insulin compared to his previous visit.  He tells me he has been eating more than normal over the last couple weeks, so he is hesitant to make any changes based on this data.  He has been having frequent highs at night but hardly ever wakes up low.  He tells me he had a pattern of becoming low right before dinner which is why his carb ratio for lunch time has not been strengthened.    Pump Settings  Basal Rate              MN  -  4A:        0.450 U/hr              4A   -  12P:      0.750 U/hr              12P   -  4P:      0.850 U/hr              4P   -  8P:        0.900 U/hr              8P   -  10P:      0.850 U/hr              10P - MN:        0.700 U/hr     Carb Ratio              MN  -  1030A:   1:8              1030A  - 4P:      1:9               4P - MN:           1:9 (strengthened from 1:11 at last appt)                ISF              MN  -  12P:      1:40              12P -  MN:       1:50                 Target: 120 mg/dl overnight beginning at midnight, 110 mg/dL 6A-12A              IAT: 3.5 hr    TDD 51.1 (previously 38.9u)  Basal 57%; Bolus 43%      Lab Results   Component Value Date    HGBA1C 6.1 (H) 11/30/2022       Glucagon: Baqsimi Rx sent at last appt  Back-up basal insulin: has Lantus    Glucose Monitoring:  Meter/CGM: Dexcom G6 - CGM/pump data reviewed. Report in media tab.          Hypoglycemic Episodes:  Not frequent.    DKA:  "denies      Diet/Exercise:  Lately was eating more than previously  Not much exercise     Screening / DM Complications:  Neuropathy: denies  Last foot exam : 11/30/2022  Last eye exam : 06/09/2021;  needs to make appt with optho  CVD/MI: no  Nephropathy: no  Lab Results   Component Value Date    MICALBCREAT 2.5 07/27/2022         Lab Results   Component Value Date    TSH 1.497 07/27/2022     No results found for: "TTGIGA"      Lipids: not on statin  Lab Results   Component Value Date    CHOL 159 07/27/2022    TRIG 37 07/27/2022    HDL 62 07/27/2022    LDLCALC 89.6 07/27/2022    CHOLHDL 39.0 07/27/2022         Current Outpatient Medications:     blood sugar diagnostic Strp, To check BG 8 times daily, to use with insurance preferred meter, Disp: 600 strip, Rfl: 3    blood-glucose sensor (DEXCOM G6 SENSOR) Shannon, USE AS DIRECTED, CHANGE SENSOR EVERY 10 DAYS, Disp: 9 each, Rfl: 3    blood-glucose transmitter (DEXCOM G6 TRANSMITTER) Shannon, USE AS DIRECTED, Disp: 1 each, Rfl: 3    insulin pump cart,auto,BT-cntr (OMNIPOD 5 G6 INTRO KIT, GEN 5,) Crtg, Change pod every 3 days, Disp: 1 each, Rfl: 0    insulin pump cart,automated,BT (OMNIPOD 5 G6 PODS, GEN 5,) Crtg, Inject 1 each into the skin Every 3 (three) days., Disp: 30 each, Rfl: 3    NOVOLOG U-100 INSULIN ASPART 100 unit/mL injection, Use 200 units in omnipod pump every 3 days, Disp: 2 each, Rfl: 8    pen needle, diabetic (BD ULTRA-FINE DAMIR PEN NEEDLE) 32 gauge x 5/32" Ndle, 1 each by Misc.(Non-Drug; Combo Route) route once daily., Disp: 30 each, Rfl: 3    glucagon (BAQSIMI) 3 mg/actuation Spry, 3 mg by Insufflation route as needed (for severe hypoglycemia). USE PER LABEL INSTRUCTIONS PRN LOW BLOOD SUGAR, Disp: 1 each, Rfl: 1    insulin (LANTUS SOLOSTAR U-100 INSULIN) glargine 100 units/mL SubQ pen, INJECT 20 UNITS SUBCUTANEOUSLY ONCE DAILY IN EVENT OF PUMP FAILURE, Disp: 15 mL, Rfl: 1    insulin lispro-aabc (LYUMJEV U-100 INSULIN) 100 unit/mL, Inject 200 units every 3 " days - to use with insulin pump, Disp: 10 mL, Rfl: 6    ROS as above    Objective:     Vitals:    09/20/23 1012   BP: 120/86   Pulse: 88   Resp: 18       Wt Readings from Last 3 Encounters:   09/20/23 86.8 kg (191 lb 5.8 oz)   11/30/22 81.5 kg (179 lb 12.6 oz)   08/24/22 76.8 kg (169 lb 5 oz)     Body mass index is 26.69 kg/m².  Physical Exam  Constitutional:       General: He is not in acute distress.  HENT:      Mouth/Throat:      Mouth: Mucous membranes are moist.   Cardiovascular:      Rate and Rhythm: Normal rate.   Pulmonary:      Effort: Pulmonary effort is normal. No respiratory distress.   Musculoskeletal:      Right lower leg: No edema.      Left lower leg: No edema.   Skin:     General: Skin is warm and dry.   Neurological:      Mental Status: He is oriented to person, place, and time.   Psychiatric:         Mood and Affect: Mood normal.         Behavior: Behavior normal.           LABS    Chemistry        Component Value Date/Time     07/27/2022 0810    K 3.9 07/27/2022 0810     07/27/2022 0810    CO2 27 07/27/2022 0810    BUN 13 07/27/2022 0810    CREATININE 0.8 07/27/2022 0810     (H) 07/27/2022 0810        Component Value Date/Time    CALCIUM 9.0 07/27/2022 0810    ALKPHOS 72 07/27/2022 0810    AST 25 07/27/2022 0810    ALT 33 07/27/2022 0810    BILITOT 1.3 (H) 07/27/2022 0810    ESTGFRAFRICA >60.0 07/27/2022 0810    EGFRNONAA >60.0 07/27/2022 0810              Assessment and Plan     Problem List Items Addressed This Visit          Endocrine    Type 1 diabetes     Overall good control of T1DM with TIR 64% - slightly worsened from previous visit  We discussed his diet from last week was very different from his usual diet, so he will observe his trends for the next couple of weeks and make adjustments to his lunch carb ratio based on that.  At this point we will only change his carb ratio for dinner.  Today I am refilling Novolog and sending Rx for Baqsimi, also sending for Lyumjev  given patient did not tolerate Fiasp (local burning, side effects)  He is due for eye appt - he will schedule an appt, he'll have to find a new ophtho  Foot exam due next visit  F/u in 3mo with a1c before      Pump Settings (changes in bold)  Basal Rate              MN  -  4A:        0.450 U/hr              4A   -  12P:      0.750 U/hr              12P   -  4P:      0.850 U/hr              4P   -  8P:        0.900 U/hr              8P   -  10P:      0.850 U/hr              10P - MN:        0.700 U/hr     Carb Ratio              MN  -  1030A:    1:8              1030A  - 4P:      1:9               4P - MN:           1:8 (strengthened from 1:9)                ISF              MN  -  12P:      1:40              12P -  MN:       1:50                 Target: 120 mg/dl overnight beginning at midnight, 110 mg/dL 6A-12A              IAT: 3.5 hr         Relevant Medications    insulin lispro-aabc (LYUMJEV U-100 INSULIN) 100 unit/mL    insulin (LANTUS SOLOSTAR U-100 INSULIN) glargine 100 units/mL SubQ pen    glucagon (BAQSIMI) 3 mg/actuation Spry    Other Relevant Orders    TSH    Hemoglobin A1C    Microalbumin/Creatinine Ratio, Urine    Basic Metabolic Panel    Insulin pump in place - Primary     Omnipod 5 with Novolog and Dexcom G6  Some issues with site - placed a pod on his leg, didn't work as well. Discussed with diabetes education.            Pump backup plan    If the insulin pump is non functional and discontinued for anticipated more than 20 hours, please give daily injections of:  Long acting insulin: Lantus 20-22 units daily  Short acting insulin:  Novolog with carb ratio of 1 unit per 8 grams and correction 1 unit per 50 mg/dL    When the insulin pump is restarted, do not restart basal rates until at least 22 hours after the last long acting insulin injection. You can set a 0% temporary basal setting that will last until this time and use your pump to bolus for meals and correction.    For any technical insulin  pump issues, please contact the insulin pump company; the toll free number is printed on the label on the back of the insulin pump.    RTC 3mo with a1c before     Jaida Rivas MD  [facility], [address]th [address]  [address] [place], [place], [address]    Office: [phone]  Fax: [phone]

## 2023-09-26 DIAGNOSIS — E10.9 TYPE 1 DIABETES MELLITUS WITHOUT COMPLICATION: ICD-10-CM

## 2023-09-27 RX ORDER — INSULIN GLARGINE 100 [IU]/ML
INJECTION, SOLUTION SUBCUTANEOUS
Qty: 15 ML | Refills: 1 | Status: SHIPPED | OUTPATIENT
Start: 2023-09-27

## 2023-10-29 DIAGNOSIS — E10.9 TYPE 1 DIABETES MELLITUS WITHOUT COMPLICATION: ICD-10-CM

## 2023-10-30 ENCOUNTER — PATIENT MESSAGE (OUTPATIENT)
Dept: ENDOCRINOLOGY | Facility: CLINIC | Age: 33
End: 2023-10-30
Payer: COMMERCIAL

## 2023-10-30 DIAGNOSIS — E10.9 TYPE 1 DIABETES MELLITUS WITHOUT COMPLICATION: ICD-10-CM

## 2023-10-30 RX ORDER — INSULIN LISPRO-AABC 100 [IU]/ML
INJECTION, SOLUTION INTRAVENOUS; SUBCUTANEOUS
Qty: 10 ML | Refills: 6 | Status: SHIPPED | OUTPATIENT
Start: 2023-10-30 | End: 2023-10-30 | Stop reason: ALTCHOICE

## 2023-10-30 RX ORDER — INSULIN ASPART 100 [IU]/ML
INJECTION, SOLUTION INTRAVENOUS; SUBCUTANEOUS
Qty: 20 ML | Refills: 11 | Status: SHIPPED | OUTPATIENT
Start: 2023-10-30 | End: 2023-12-20

## 2023-11-06 NOTE — TELEPHONE ENCOUNTER
----- Message from Magdalena Yung sent at 11/19/2020  3:38 PM CST -----  Regarding: insuenma/   Contact: zhang from [employer]  [phone] opt#2-please call concerning patient ginna martinez said they faxed over paper work have not heard back from the office waiting on a call thanks.    
She is a 73-year-old female with history of mild persistent asthma who is seen today for follow up. To recap,  She was seen 1/16/2019 and treated for exacerbation. She canceled her follow-up appointment for May 2019. Evaluation by cardiology, palpitations, shortness of breath. EF 49-13%, normal diastolic function. Mild LVH. RVSP estimated at 15 mmHg. Reports dx of pneumonia, + COVID (spouse hospitalized). At visit 11/2020, patient reported recent diagnosis of Covid,  was hospitalized with Covid. She reports having \"walking pneumonia\" and had outside chest x-ray at urgent care which was unavailable at time of her visit. Her main symptoms were fever, cough. She had improved symptomatically. She was last seen 10/2022 and was stable from respiratory standpoint with interval improvement in spirometry was compliant with prescribed regimen. Records show that she was hospitalized 2/2023 with subarachnoid hemorrhage. This was manifested by sudden onset of severe headache. Hospital records are reviewed. DIAGNOSTICS:       CPFt's 2002. CPFT's 3/2003. Normal spirometry, flattening of inspiratory loop. Normal lung volumes, decreased diffusion capacity - normalized after bronchodilator. CXR 2005 - no acute findings. Spirometry 11/2007 - normal spirometry, normal flow volume loop. CXR 11/2015 - outside study, unremarkable. Spirometry 12/2015 - normal.  Spirometry 11/29/2018-suggest restrictive defect, interval decline. CXR 1/16/2019-lungs are clear, no acute abnormality. CXR 9/28/2020-clear lungs, no acute abnormality. Echocardiogram 10/12/2020-EF 51-27%, normal diastolic function. Mild LVH. RVSP estimated at 15 mmHg. Spirometry 11/5/2020-is normal, no significant change. Spirometry 11/12/2021-unable to perform secondary to computer issues. Spirometry 10/17/2022-normal.  Interval improvement in FVC and FEV1. CXR 2/2023-lungs are clear.
Spoke with Fabiola. Omnipod paperwork needs to be corrected.   
Encounter   Medications    albuterol sulfate HFA (PROVENTIL;VENTOLIN;PROAIR) 108 (90 Base) MCG/ACT inhaler     Si puffs 4 times daily if needed for shortness of breath or wheezing. Patient will call when refills are needed     Dispense:  3 each     Refill:  3    budesonide-formoterol (SYMBICORT) 160-4.5 MCG/ACT AERO     Si puffs bid, rinse mouth after use. Indications: allergic asthma     Dispense:  3 each     Refill:  3    pneumococcal 20-valent conjugat (PREVNAR) 0.5 ML ROSALIO inj     Sig: Inject 0.5 mLs into the muscle once for 1 dose     Dispense:  1 each     Refill:  0           KRISH ALARCON - TAMMIE    Total  time spent with patient -35  min. Collaborating MD: Dr. Susan Mojica:    She is a 71-year-old female with history of mild persistent asthma who is seen today for follow up. Reports some increase in shortness of breath and wheezing, she has attributed some of the shortness of breath secondary to weight gain. She declined weight today, but states that she has had to increase two sizes. Has been found to have thyroid disorder and is on Rx over the past month. She has good response to albuterol inhaler, using 1-2 times per day intermittently but not on daily basis. She has also increase Symbicort back to standard dosing due to increased symptoms. DIAGNOSTICS:       CPFt's . CPFT's 3/2003. Normal spirometry, flattening of inspiratory loop. Normal lung volumes, decreased diffusion capacity - normalized after bronchodilator. CXR  - no acute findings. Spirometry 2007 - normal spirometry, normal flow volume loop. CXR 2015 - outside study, unremarkable. Spirometry 2015 - normal.  Spirometry 2018-suggest restrictive defect, interval decline. CXR 2019-lungs are clear, no acute abnormality. CXR 2020-clear lungs, no acute abnormality. Echocardiogram 10/12/2020-EF 94-90%, normal diastolic function. Mild LVH.   RVSP

## 2023-12-18 ENCOUNTER — LAB VISIT (OUTPATIENT)
Dept: LAB | Facility: HOSPITAL | Age: 33
End: 2023-12-18
Payer: COMMERCIAL

## 2023-12-18 DIAGNOSIS — E10.9 TYPE 1 DIABETES MELLITUS WITHOUT COMPLICATION: ICD-10-CM

## 2023-12-18 LAB
ANION GAP SERPL CALC-SCNC: 7 MMOL/L (ref 8–16)
BUN SERPL-MCNC: 11 MG/DL (ref 6–20)
CALCIUM SERPL-MCNC: 9.2 MG/DL (ref 8.7–10.5)
CHLORIDE SERPL-SCNC: 103 MMOL/L (ref 95–110)
CHOLEST SERPL-MCNC: 179 MG/DL (ref 120–199)
CHOLEST/HDLC SERPL: 3.1 {RATIO} (ref 2–5)
CO2 SERPL-SCNC: 28 MMOL/L (ref 23–29)
CREAT SERPL-MCNC: 0.9 MG/DL (ref 0.5–1.4)
EST. GFR  (NO RACE VARIABLE): >60 ML/MIN/1.73 M^2
ESTIMATED AVG GLUCOSE: 137 MG/DL (ref 68–131)
GLUCOSE SERPL-MCNC: 152 MG/DL (ref 70–110)
HBA1C MFR BLD: 6.4 % (ref 4–5.6)
HDLC SERPL-MCNC: 58 MG/DL (ref 40–75)
HDLC SERPL: 32.4 % (ref 20–50)
LDLC SERPL CALC-MCNC: 109.4 MG/DL (ref 63–159)
NONHDLC SERPL-MCNC: 121 MG/DL
POTASSIUM SERPL-SCNC: 4.1 MMOL/L (ref 3.5–5.1)
SODIUM SERPL-SCNC: 138 MMOL/L (ref 136–145)
TRIGL SERPL-MCNC: 58 MG/DL (ref 30–150)
TSH SERPL DL<=0.005 MIU/L-ACNC: 1.2 UIU/ML (ref 0.4–4)

## 2023-12-18 PROCEDURE — 80048 BASIC METABOLIC PNL TOTAL CA: CPT | Performed by: STUDENT IN AN ORGANIZED HEALTH CARE EDUCATION/TRAINING PROGRAM

## 2023-12-18 PROCEDURE — 80061 LIPID PANEL: CPT | Performed by: STUDENT IN AN ORGANIZED HEALTH CARE EDUCATION/TRAINING PROGRAM

## 2023-12-18 PROCEDURE — 36415 COLL VENOUS BLD VENIPUNCTURE: CPT | Performed by: STUDENT IN AN ORGANIZED HEALTH CARE EDUCATION/TRAINING PROGRAM

## 2023-12-18 PROCEDURE — 84443 ASSAY THYROID STIM HORMONE: CPT | Performed by: STUDENT IN AN ORGANIZED HEALTH CARE EDUCATION/TRAINING PROGRAM

## 2023-12-18 PROCEDURE — 83036 HEMOGLOBIN GLYCOSYLATED A1C: CPT | Performed by: STUDENT IN AN ORGANIZED HEALTH CARE EDUCATION/TRAINING PROGRAM

## 2023-12-19 NOTE — PROGRESS NOTES
Pump Clinic Return Visit  12/20/2023    The patient's last visit with me was on 9/20/2023.     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  In the past when he tried fiasp insulin he had irritation at his pump sites so was not able to tolerate.    Blood sugar has been fairly well-controlled but had 1 day when transmitter needed to be replaced so he was in manual mode, fell asleep and forgot to restart auto mode with significant hyperglycemia    Pump Settings (changes in bold)  Basal Rate              MN  -  4A:        0.450 U/hr              4A   -  12P:      0.750 U/hr              12P   -  4P:      0.850 U/hr              4P   -  8P:        0.900 U/hr              8P   -  10P:      0.850 U/hr              10P - MN:        0.700 U/hr     Carb Ratio              MN  -  1030A:    1:8              1030A  - 4P:      1:9               4P - MN:           1:8                 ISF              MN  -  12P:      1:40              12P -  MN:       1:50                 Target: 120 mg/dl overnight beginning at midnight, 110 mg/dL 6A-12A              IAT: 3.5 hr      Lab Results   Component Value Date    HGBA1C 6.4 (H) 12/18/2023       Glucagon: has Baqsimi   Back-up basal insulin: has Lantus    Glucose Monitoring:  Meter/CGM: Dexcom G6 - CGM/pump data reviewed. Report in media tab.          Hypoglycemic Episodes:  Not frequent.    DKA: denies      Diet/Exercise:  Lately was eating more than previously  Not much exercise     Screening / DM  "Complications:  Neuropathy: denies  Last foot exam : 11/30/2022  Last eye exam : 06/09/2021;  needs to make appt with optho  CVD/MI: no  Nephropathy: no  Lab Results   Component Value Date    MICALBCREAT 2.5 07/27/2022         Lab Results   Component Value Date    TSH 1.203 12/18/2023     No results found for: "TTGIGA"      Lipids: not on statin  Lab Results   Component Value Date    CHOL 179 12/18/2023    TRIG 58 12/18/2023    HDL 58 12/18/2023    LDLCALC 109.4 12/18/2023    CHOLHDL 32.4 12/18/2023         Current Outpatient Medications:     blood sugar diagnostic Strp, To check BG 8 times daily, to use with insurance preferred meter, Disp: 600 strip, Rfl: 3    blood-glucose sensor (DEXCOM G6 SENSOR) Shannon, USE AS DIRECTED, CHANGE SENSOR EVERY 10 DAYS, Disp: 9 each, Rfl: 3    blood-glucose transmitter (DEXCOM G6 TRANSMITTER) Shannon, USE AS DIRECTED, Disp: 1 each, Rfl: 3    glucagon (BAQSIMI) 3 mg/actuation Spry, 3 mg by Insufflation route as needed (for severe hypoglycemia). USE PER LABEL INSTRUCTIONS PRN LOW BLOOD SUGAR, Disp: 1 each, Rfl: 1    insulin pump cart,auto,BT-cntr (OMNIPOD 5 G6 INTRO KIT, GEN 5,) Crtg, Change pod every 3 days, Disp: 1 each, Rfl: 0    insulin pump cart,automated,BT (OMNIPOD 5 G6 PODS, GEN 5,) Crtg, Inject 1 each into the skin Every 3 (three) days., Disp: 30 each, Rfl: 3    LANTUS SOLOSTAR U-100 INSULIN glargine 100 units/mL SubQ pen, INJECT 20 UNITS SUBCUTANEOUSLY ONCE DAILY IN EVENT OF PUMP FAILURE, Disp: 15 mL, Rfl: 1    NOVOLOG U-100 INSULIN ASPART 100 unit/mL injection, Inject up to 67 units daily via insulin pump., Disp: 20 mL, Rfl: 11    pen needle, diabetic (BD ULTRA-FINE DAMIR PEN NEEDLE) 32 gauge x 5/32" Ndle, 1 each by Misc.(Non-Drug; Combo Route) route once daily., Disp: 30 each, Rfl: 3    ROS as above    Objective:     There were no vitals filed for this visit.      Wt Readings from Last 3 Encounters:   09/20/23 86.8 kg (191 lb 5.8 oz)   11/30/22 81.5 kg (179 lb 12.6 oz) "   08/24/22 76.8 kg (169 lb 5 oz)     There is no height or weight on file to calculate BMI.  Constitutional:  Pleasant,  in no acute distress.   HENT:   Eyes:     No scleral icterus.   Respiratory:   Effort normal   Neurological:  normal speech  Psych:  Normal mood and affect.        LABS    Chemistry        Component Value Date/Time     12/18/2023 1422    K 4.1 12/18/2023 1422     12/18/2023 1422    CO2 28 12/18/2023 1422    BUN 11 12/18/2023 1422    CREATININE 0.9 12/18/2023 1422     (H) 12/18/2023 1422        Component Value Date/Time    CALCIUM 9.2 12/18/2023 1422    ALKPHOS 72 07/27/2022 0810    AST 25 07/27/2022 0810    ALT 33 07/27/2022 0810    BILITOT 1.3 (H) 07/27/2022 0810    ESTGFRAFRICA >60.0 07/27/2022 0810    EGFRNONAA >60.0 07/27/2022 0810              Assessment and Plan     Problem List Items Addressed This Visit          1 - High    Type 1 diabetes - Primary     Doing well today so no changes made to insulin pump settings.    Needs new meter and fingerstick testing supplies which were sent to pharmacy.      Will benefit from faster acting insulin so will send prescription for fiasp to pharmacy and if not covered will try for lyumjev    As he has had type 1 diabetes for more than 20 years will start low-dose statin with Crestor 5 mg daily         Relevant Medications    blood-glucose meter kit    lancets Misc    blood sugar diagnostic Strp    insulin aspart, niacinamide, (FIASP U-100 INSULIN) 100 unit/mL Soln    rosuvastatin (CRESTOR) 5 MG tablet    Other Relevant Orders    Hemoglobin A1C    Lipid Panel       2     Insulin pump in place     Basal Rate              MN  -  4A:        0.450 U/hr              4A   -  12P:      0.750 U/hr              12P   -  4P:      0.850 U/hr              4P   -  8P:        0.900 U/hr              8P   -  10P:      0.850 U/hr              10P - MN:        0.700 U/hr     Carb Ratio              MN  -  1030A:    1:8              1030A  - 4P:      1:9                4P - MN:           1:8                 ISF              MN  -  12P:      1:40              12P -  MN:       1:50                 Target: 120 mg/dl overnight beginning at midnight, 110 mg/dL 6A-12A              IAT: 3.5 hr    Patient Instructions     Pump backup plan  If the insulin pump is non functional and discontinued for anticipated more than 20 hours, please give daily injections of:  Long acting insulin 22 units daily  Short acting insulin  for meals according to carb ratios and sensitivity factor in the pump.    For any technical insulin pump issues, please contact the insulin pump company; the toll free number is printed on the label on the back of the insulin pump.                Wed am pump clinic in 6 month(s) virtual. Labs prior hbA1c, lipid, urine    Derrek De Los Santos MD

## 2023-12-20 ENCOUNTER — PATIENT MESSAGE (OUTPATIENT)
Dept: ENDOCRINOLOGY | Facility: CLINIC | Age: 33
End: 2023-12-20

## 2023-12-20 ENCOUNTER — OFFICE VISIT (OUTPATIENT)
Dept: ENDOCRINOLOGY | Facility: CLINIC | Age: 33
End: 2023-12-20
Payer: COMMERCIAL

## 2023-12-20 ENCOUNTER — PATIENT OUTREACH (OUTPATIENT)
Dept: ENDOCRINOLOGY | Facility: CLINIC | Age: 33
End: 2023-12-20

## 2023-12-20 VITALS
WEIGHT: 190.06 LBS | HEART RATE: 77 BPM | DIASTOLIC BLOOD PRESSURE: 80 MMHG | BODY MASS INDEX: 26.61 KG/M2 | HEIGHT: 71 IN | SYSTOLIC BLOOD PRESSURE: 120 MMHG | OXYGEN SATURATION: 97 %

## 2023-12-20 DIAGNOSIS — Z96.41 INSULIN PUMP IN PLACE: ICD-10-CM

## 2023-12-20 DIAGNOSIS — E10.9 TYPE 1 DIABETES MELLITUS WITHOUT COMPLICATION: Primary | ICD-10-CM

## 2023-12-20 PROCEDURE — 99999 PR PBB SHADOW E&M-EST. PATIENT-LVL III: ICD-10-PCS | Mod: PBBFAC,,,

## 2023-12-20 PROCEDURE — 3008F BODY MASS INDEX DOCD: CPT | Mod: CPTII,S$GLB,, | Performed by: INTERNAL MEDICINE

## 2023-12-20 PROCEDURE — 3044F HG A1C LEVEL LT 7.0%: CPT | Mod: CPTII,S$GLB,, | Performed by: INTERNAL MEDICINE

## 2023-12-20 PROCEDURE — 3079F PR MOST RECENT DIASTOLIC BLOOD PRESSURE 80-89 MM HG: ICD-10-PCS | Mod: CPTII,S$GLB,, | Performed by: INTERNAL MEDICINE

## 2023-12-20 PROCEDURE — 1159F PR MEDICATION LIST DOCUMENTED IN MEDICAL RECORD: ICD-10-PCS | Mod: CPTII,S$GLB,, | Performed by: INTERNAL MEDICINE

## 2023-12-20 PROCEDURE — 1159F MED LIST DOCD IN RCRD: CPT | Mod: CPTII,S$GLB,, | Performed by: INTERNAL MEDICINE

## 2023-12-20 PROCEDURE — 99214 PR OFFICE/OUTPT VISIT, EST, LEVL IV, 30-39 MIN: ICD-10-PCS | Mod: 25,S$GLB,, | Performed by: INTERNAL MEDICINE

## 2023-12-20 PROCEDURE — 3074F SYST BP LT 130 MM HG: CPT | Mod: CPTII,S$GLB,, | Performed by: INTERNAL MEDICINE

## 2023-12-20 PROCEDURE — 3008F PR BODY MASS INDEX (BMI) DOCUMENTED: ICD-10-PCS | Mod: CPTII,S$GLB,, | Performed by: INTERNAL MEDICINE

## 2023-12-20 PROCEDURE — 3074F PR MOST RECENT SYSTOLIC BLOOD PRESSURE < 130 MM HG: ICD-10-PCS | Mod: CPTII,S$GLB,, | Performed by: INTERNAL MEDICINE

## 2023-12-20 PROCEDURE — 3044F PR MOST RECENT HEMOGLOBIN A1C LEVEL <7.0%: ICD-10-PCS | Mod: CPTII,S$GLB,, | Performed by: INTERNAL MEDICINE

## 2023-12-20 PROCEDURE — 3079F DIAST BP 80-89 MM HG: CPT | Mod: CPTII,S$GLB,, | Performed by: INTERNAL MEDICINE

## 2023-12-20 PROCEDURE — 95251 PR GLUCOSE MONITOR, 72 HOUR, PHYS INTERP: ICD-10-PCS | Mod: S$GLB,,, | Performed by: INTERNAL MEDICINE

## 2023-12-20 PROCEDURE — 99214 OFFICE O/P EST MOD 30 MIN: CPT | Mod: 25,S$GLB,, | Performed by: INTERNAL MEDICINE

## 2023-12-20 PROCEDURE — 99999 PR PBB SHADOW E&M-EST. PATIENT-LVL III: CPT | Mod: PBBFAC,,,

## 2023-12-20 PROCEDURE — 95251 CONT GLUC MNTR ANALYSIS I&R: CPT | Mod: S$GLB,,, | Performed by: INTERNAL MEDICINE

## 2023-12-20 RX ORDER — INSULIN ASPART INJECTION 100 [IU]/ML
INJECTION, SOLUTION SUBCUTANEOUS
Qty: 60 ML | Refills: 3 | Status: SHIPPED | OUTPATIENT
Start: 2023-12-20 | End: 2023-12-20

## 2023-12-20 RX ORDER — INSULIN PUMP SYRINGE, 3 ML
EACH MISCELLANEOUS
Qty: 1 EACH | Refills: 1 | Status: SHIPPED | OUTPATIENT
Start: 2023-12-20

## 2023-12-20 RX ORDER — LANCETS
EACH MISCELLANEOUS
Qty: 200 EACH | Refills: 5 | Status: SHIPPED | OUTPATIENT
Start: 2023-12-20

## 2023-12-20 RX ORDER — ROSUVASTATIN CALCIUM 5 MG/1
5 TABLET, COATED ORAL DAILY
Qty: 90 TABLET | Refills: 3 | Status: SHIPPED | OUTPATIENT
Start: 2023-12-20 | End: 2024-12-19

## 2023-12-20 RX ORDER — INSULIN LISPRO-AABC 100 [IU]/ML
INJECTION, SOLUTION INTRAVENOUS; SUBCUTANEOUS
Qty: 60 ML | Refills: 3 | Status: SHIPPED | OUTPATIENT
Start: 2023-12-20 | End: 2024-02-06

## 2023-12-20 NOTE — TELEPHONE ENCOUNTER
1. Type 1 diabetes mellitus without complication  Sent to [facility], to be filled after 1/1/24  - insulin lispro-aabc (LYUMJEV U-100 INSULIN) 100 unit/mL; Inject up to 65 units daily via insulin pump.  Dispense: 60 mL; Refill: 3

## 2023-12-20 NOTE — ASSESSMENT & PLAN NOTE
Doing well today so no changes made to insulin pump settings.    Needs new meter and fingerstick testing supplies which were sent to pharmacy.      Will benefit from faster acting insulin so will send prescription for fiasp to pharmacy and if not covered will try for lyumjev    As he has had type 1 diabetes for more than 20 years will start low-dose statin with Crestor 5 mg daily

## 2023-12-20 NOTE — PATIENT INSTRUCTIONS
Pump backup plan  If the insulin pump is non functional and discontinued for anticipated more than 20 hours, please give daily injections of:  Long acting insulin 22 units daily  Short acting insulin  for meals according to carb ratios and sensitivity factor in the pump.    For any technical insulin pump issues, please contact the insulin pump company; the toll free number is printed on the label on the back of the insulin pump.

## 2023-12-20 NOTE — ASSESSMENT & PLAN NOTE
Basal Rate              MN  -  4A:        0.450 U/hr              4A   -  12P:      0.750 U/hr              12P   -  4P:      0.850 U/hr              4P   -  8P:        0.900 U/hr              8P   -  10P:      0.850 U/hr              10P - MN:        0.700 U/hr     Carb Ratio              MN  -  1030A:    1:8              1030A  - 4P:      1:9               4P - MN:           1:8                 ISF              MN  -  12P:      1:40              12P -  MN:       1:50                 Target: 120 mg/dl overnight beginning at midnight, 110 mg/dL 6A-12A              IAT: 3.5 hr    Patient Instructions       Pump backup plan  If the insulin pump is non functional and discontinued for anticipated more than 20 hours, please give daily injections of:  Long acting insulin 22 units daily  Short acting insulin  for meals according to carb ratios and sensitivity factor in the pump.    For any technical insulin pump issues, please contact the insulin pump company; the toll free number is printed on the label on the back of the insulin pump.

## 2023-12-21 NOTE — PROGRESS NOTES
DIABETES EDUCATION NOTE  12/20/2023    Patient was seen by me in the multidisciplinary insulin pump clinic and the following topics were addressed:    Pump/CGM site concerns?  [x] No  [] Yes    Can recognize/manage pump  [] No  [x] Yes  Malfunction?    Meal patterns:    Specific diet? Low carb   Meals/day 2-3   Snacks  occasionally   Carb counting yes    Appropriate correction   [] No  [x] Yes  of lows/highs?    Ability to upload data from home? [] No  [x] Yes     Patient seen in pump clinic today.  Pump and CGM download were evaluated.  Covered back up insulin plan with patient in the event of pump failure/lack of supplies.  Troubleshooting of pump/infusion sites discussed.   Advised to stay current with pump supply reorders. Reviewed site placement rotation and frequency of site changes.

## 2024-02-06 ENCOUNTER — PATIENT MESSAGE (OUTPATIENT)
Dept: ENDOCRINOLOGY | Facility: CLINIC | Age: 34
End: 2024-02-06
Payer: COMMERCIAL

## 2024-02-06 DIAGNOSIS — E10.9 TYPE 1 DIABETES MELLITUS WITHOUT COMPLICATION: Primary | ICD-10-CM

## 2024-02-06 RX ORDER — INSULIN LISPRO 100 [IU]/ML
INJECTION, SOLUTION INTRAVENOUS; SUBCUTANEOUS
Qty: 60 ML | Refills: 3 | Status: SHIPPED | OUTPATIENT
Start: 2024-02-06

## 2024-02-06 NOTE — TELEPHONE ENCOUNTER
1. Type 1 diabetes mellitus without complication  - insulin lispro (HUMALOG U-100 INSULIN) 100 unit/mL injection; Inject up to 65 units daily via insulin pump.  Dispense: 60 mL; Refill: 3

## 2024-03-02 DIAGNOSIS — E10.9 TYPE 1 DIABETES MELLITUS WITHOUT COMPLICATION: Primary | ICD-10-CM

## 2024-03-04 RX ORDER — BLOOD-GLUCOSE SENSOR
EACH MISCELLANEOUS
Qty: 9 EACH | Refills: 3 | Status: SHIPPED | OUTPATIENT
Start: 2024-03-04

## 2024-03-04 RX ORDER — BLOOD-GLUCOSE TRANSMITTER
EACH MISCELLANEOUS
Qty: 1 EACH | Refills: 3 | Status: SHIPPED | OUTPATIENT
Start: 2024-03-04

## 2024-06-07 ENCOUNTER — PATIENT MESSAGE (OUTPATIENT)
Dept: ENDOCRINOLOGY | Facility: CLINIC | Age: 34
End: 2024-06-07
Payer: COMMERCIAL

## 2024-07-02 ENCOUNTER — PATIENT MESSAGE (OUTPATIENT)
Dept: ENDOCRINOLOGY | Facility: CLINIC | Age: 34
End: 2024-07-02
Payer: COMMERCIAL

## 2024-08-18 DIAGNOSIS — E10.9 TYPE 1 DIABETES MELLITUS WITHOUT COMPLICATION: ICD-10-CM

## 2024-08-19 RX ORDER — INSULIN PMP CART,AUT,G6/7,CNTR
1 EACH SUBCUTANEOUS
Qty: 30 EACH | Refills: 3 | Status: SHIPPED | OUTPATIENT
Start: 2024-08-19

## 2024-08-28 NOTE — PROGRESS NOTES
Pump Clinic Return Visit  08/29/2024    Jamshid Norwood is a 34 y.o.male presenting for follow-up of type 1 DM      The patient's last visit was in 12/2023. At that time carb intake was higher than normal and he was having postprandial hyperglycemia requiring many correction boluses and he was entering in fake carbs as well to bring down BG. We strengthened his dinner ICR but held off on strengthening lunch ICR. We discussed that if after 1wk he was still having postprandial hyperglycemia after lunch he will strengthen lunch carb ratio and let us know.     Tried Fiasp and Lyumjev -- had leaking with Fiasp and burning with Lyumjev so he is now using Humalog with his pump.      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[employer]    Diagnosed at age 10 (2001)    Known complications: possible retinopathy although changes may be related to previous trauma to R eye    Current diabetes regimen:  Pump: Omnipod 5 and CIQ with Humalog insulin and dexcom G6      Pump Settings (changes in bold)  Basal Rate              MN: 0.450 U/hr              4A:      0.750 U/hr              12P:    0.850 U/hr              4P:      0.900 U/hr              8P:      0.850 U/hr              10P:    0.700 U/hr     Carb Ratio              MN:        1u:8 g              10:30A:      1u:9 g               4P:             1u:8 g                ISF              MN:        1u:40 mg/dL              12P:       1u:40 mg/dL     Target/Correct above:    MN: 120/120 mg/dL   6A: 110/120 mg/dL    IAT: 3.5 hr      Lab Results   Component Value Date    HGBA1C 6.4 (H) 12/18/2023       Glucagon: has Baqsimi - expires soon  Back-up basal insulin: has Lantus - expires soon     Glucose Monitoring:  Meter/CGM: Dexcom G6 - CGM/pump data reviewed. Report in media tab.        Hypoglycemic Episodes:  Not frequent.    DKA: denies; never       Diet/Exercise:  Lately was eating more than previously  Not much exercise     Screening / DM  "Complications:  Neuropathy: denies  Last foot exam : 11/30/2022 -- done today  Last eye exam : 06/09/2021; needs to make appt  CVD/MI: no  Nephropathy: no  Lab Results   Component Value Date    MICALBCREAT 2.5 07/27/2022         Lab Results   Component Value Date    TSH 1.203 12/18/2023     No results found for: "TTGIGA"      Lipids: Crestor 5 mg prescribed but pt not taking -- "not enough evidence for use"  Lab Results   Component Value Date    CHOL 179 12/18/2023    TRIG 58 12/18/2023    HDL 58 12/18/2023    LDLCALC 109.4 12/18/2023    CHOLHDL 32.4 12/18/2023         Current Outpatient Medications:     blood sugar diagnostic Strp, To check BG 4 times daily, to use with insurance preferred meter, Disp: 200 strip, Rfl: 5    blood-glucose meter kit, To check BG 4 times daily, to use with insurance preferred meter, Disp: 1 each, Rfl: 1    blood-glucose sensor (DEXCOM G6 SENSOR) Shannon, USE AS DIRECTED, CHANGE SENSOR EVERY 10 DAYS, Disp: 9 each, Rfl: 3    blood-glucose transmitter (DEXCOM G6 TRANSMITTER) Shannon, USE AS DIRECTED, Disp: 1 each, Rfl: 3    glucagon (BAQSIMI) 3 mg/actuation Spry, 3 mg by Insufflation route as needed (for severe hypoglycemia). USE PER LABEL INSTRUCTIONS PRN LOW BLOOD SUGAR, Disp: 1 each, Rfl: 1    insulin lispro (HUMALOG U-100 INSULIN) 100 unit/mL injection, Inject up to 65 units daily via insulin pump., Disp: 60 mL, Rfl: 3    insulin pump cart,auto,BT-cntr (OMNIPOD 5 G6 INTRO KIT, GEN 5,) Crtg, Change pod every 3 days, Disp: 1 each, Rfl: 0    insulin pump cart,automated,BT (OMNIPOD 5 G6 PODS, GEN 5,) Crtg, Inject 1 each into the skin Every 3 (three) days., Disp: 30 each, Rfl: 3    lancets Misc, To check BG 4 times daily, to use with insurance preferred meter, Disp: 200 each, Rfl: 5    LANTUS SOLOSTAR U-100 INSULIN glargine 100 units/mL SubQ pen, INJECT 20 UNITS SUBCUTANEOUSLY ONCE DAILY IN EVENT OF PUMP FAILURE, Disp: 15 mL, Rfl: 1    pen needle, diabetic (BD ULTRA-FINE DAMIR PEN NEEDLE) 32 " "gauge x 5/32" Ndle, 1 each by Misc.(Non-Drug; Combo Route) route once daily. (Patient not taking: Reported on 12/20/2023), Disp: 30 each, Rfl: 3    rosuvastatin (CRESTOR) 5 MG tablet, Take 1 tablet (5 mg total) by mouth once daily., Disp: 90 tablet, Rfl: 3    ROS as above    Objective:     Vitals:    08/29/24 1041   BP: 118/80     Wt Readings from Last 5 Encounters:   08/29/24 89.9 kg (198 lb 4.9 oz)   12/20/23 86.2 kg (190 lb 0.6 oz)   09/20/23 86.8 kg (191 lb 5.8 oz)   11/30/22 81.5 kg (179 lb 12.6 oz)   08/24/22 76.8 kg (169 lb 5 oz)        Body mass index is 27.66 kg/m².  Constitutional:  Pleasant,  in no acute distress.   HENT:   Eyes:    No scleral icterus.   Respiratory:  Effort normal   Neurological:  normal speech  Psych:  Normal mood and affect.      Protective Sensation (w/ 10 gram monofilament):  Right: Intact  Left: Intact    Visual Inspection:  Normal -  Bilateral, Nails Intact - without Evidence of Foot Deformity- Bilateral, Skin Breakdown -  Bilateral, and Callus -  Bilateral - callus medial side of great toe bilaterally    Pedal Pulses:   Right: Present  Left: Present    Posterior Tibialis Pulses:   Right:Present  Left: Present      LABS    Chemistry        Component Value Date/Time     12/18/2023 1422    K 4.1 12/18/2023 1422     12/18/2023 1422    CO2 28 12/18/2023 1422    BUN 11 12/18/2023 1422    CREATININE 0.9 12/18/2023 1422     (H) 12/18/2023 1422        Component Value Date/Time    CALCIUM 9.2 12/18/2023 1422    ALKPHOS 72 07/27/2022 0810    AST 25 07/27/2022 0810    ALT 33 07/27/2022 0810    BILITOT 1.3 (H) 07/27/2022 0810    ESTGFRAFRICA >60.0 07/27/2022 0810    EGFRNONAA >60.0 07/27/2022 0810              Assessment and Plan     Problem List Items Addressed This Visit          Endocrine    Type 1 diabetes     Overall control very close to goal - TIR 69% with goal 70% or higher; not having frequent lows  At times feels like the algorithm is not the best -- will run very " "high for a while and then rapid drop from auto adjustments  We discussed possible interest in Tandem Mobi -- he would like to meet with an educator to discuss more about  this  For today we will strengthen ISF a little bit and strengthen dinnertime ICR slightly  DM foot exam today  Referred to optometry  Update labs and uACR today         Relevant Medications    glucagon (BAQSIMI) 3 mg/actuation Spry    LANTUS SOLOSTAR U-100 INSULIN 100 unit/mL (3 mL) InPn pen    insulin lispro (HUMALOG U-100 INSULIN) 100 unit/mL injection    insulin pump cart,automated,BT (OMNIPOD 5 G6 PODS, GEN 5,) Crtg    pen needle, diabetic (BD ULTRA-FINE DAMIR PEN NEEDLE) 32 gauge x 5/32" Ndle    blood-glucose sensor (DEXCOM G6 SENSOR) Shannon    blood-glucose transmitter (DEXCOM G6 TRANSMITTER) Shannon    Other Relevant Orders    Ambulatory referral/consult to Diabetes Education    Hemoglobin A1C (Completed)    Lipid Panel (Completed)    Microalbumin/Creatinine Ratio, Urine    TSH (Completed)    TISSUE TRANSGLUTAMINASE, IGA    Comprehensive Metabolic Panel (Completed)    Ambulatory referral/consult to Optometry    Insulin pump in place - Primary     Patient Instructions   We will strengthen your dinner carb ratio and strengthen ISF from 40 to 35. If still not strong enough can change to 30.    If you are having lows after dinner go back to ICR 8 instead of 7.5.    Pump Settings (changes in bold)  Basal Rate              MN: 0.450 U/hr              4A:      0.750 U/hr              12P:    0.850 U/hr              4P:      0.900 U/hr              8P:      0.850 U/hr              10P:    0.700 U/hr     Carb Ratio              MN:        1u:8 g              10:30A:      1u:9 g               4P:             1u:7.5 g                ISF              MN:        1u:35 mg/dL              12P:       1u:35 mg/dL     Target/Correct above:    MN: 120/120 mg/dL   6A: 110/120 mg/dL              Labs and urine today - A1c, TSH, CMP, ttg, lipids, and urine micro  F/u " 3-4 in pump clinic - wed AM pump clinic if possible (virtual or in-person) - wed AM better for his work schedule rather than Thursday AM  DM education visit for pump radha Scott MD

## 2024-08-29 ENCOUNTER — OFFICE VISIT (OUTPATIENT)
Dept: ENDOCRINOLOGY | Facility: CLINIC | Age: 34
End: 2024-08-29
Payer: COMMERCIAL

## 2024-08-29 ENCOUNTER — LAB VISIT (OUTPATIENT)
Dept: LAB | Facility: HOSPITAL | Age: 34
End: 2024-08-29
Attending: STUDENT IN AN ORGANIZED HEALTH CARE EDUCATION/TRAINING PROGRAM
Payer: COMMERCIAL

## 2024-08-29 VITALS
SYSTOLIC BLOOD PRESSURE: 118 MMHG | WEIGHT: 198.31 LBS | DIASTOLIC BLOOD PRESSURE: 80 MMHG | BODY MASS INDEX: 27.76 KG/M2 | HEIGHT: 71 IN

## 2024-08-29 DIAGNOSIS — E10.9 TYPE 1 DIABETES MELLITUS WITHOUT COMPLICATION: ICD-10-CM

## 2024-08-29 DIAGNOSIS — Z96.41 INSULIN PUMP IN PLACE: Primary | ICD-10-CM

## 2024-08-29 LAB
ALBUMIN SERPL BCP-MCNC: 3.7 G/DL (ref 3.5–5.2)
ALBUMIN/CREAT UR: NORMAL UG/MG (ref 0–30)
ALP SERPL-CCNC: 81 U/L (ref 55–135)
ALT SERPL W/O P-5'-P-CCNC: 41 U/L (ref 10–44)
ANION GAP SERPL CALC-SCNC: 7 MMOL/L (ref 8–16)
AST SERPL-CCNC: 27 U/L (ref 10–40)
BILIRUB SERPL-MCNC: 0.6 MG/DL (ref 0.1–1)
BUN SERPL-MCNC: 13 MG/DL (ref 6–20)
CALCIUM SERPL-MCNC: 9.2 MG/DL (ref 8.7–10.5)
CHLORIDE SERPL-SCNC: 102 MMOL/L (ref 95–110)
CHOLEST SERPL-MCNC: 159 MG/DL (ref 120–199)
CHOLEST/HDLC SERPL: 2.7 {RATIO} (ref 2–5)
CO2 SERPL-SCNC: 28 MMOL/L (ref 23–29)
CREAT SERPL-MCNC: 0.9 MG/DL (ref 0.5–1.4)
CREAT UR-MCNC: 95 MG/DL (ref 23–375)
EST. GFR  (NO RACE VARIABLE): >60 ML/MIN/1.73 M^2
ESTIMATED AVG GLUCOSE: 140 MG/DL (ref 68–131)
GLUCOSE SERPL-MCNC: 106 MG/DL (ref 70–110)
HBA1C MFR BLD: 6.5 % (ref 4–5.6)
HDLC SERPL-MCNC: 59 MG/DL (ref 40–75)
HDLC SERPL: 37.1 % (ref 20–50)
LDLC SERPL CALC-MCNC: 91.8 MG/DL (ref 63–159)
MICROALBUMIN UR DL<=1MG/L-MCNC: <5 UG/ML
NONHDLC SERPL-MCNC: 100 MG/DL
POTASSIUM SERPL-SCNC: 4.1 MMOL/L (ref 3.5–5.1)
PROT SERPL-MCNC: 7.3 G/DL (ref 6–8.4)
SODIUM SERPL-SCNC: 137 MMOL/L (ref 136–145)
TRIGL SERPL-MCNC: 41 MG/DL (ref 30–150)
TSH SERPL DL<=0.005 MIU/L-ACNC: 1.44 UIU/ML (ref 0.4–4)

## 2024-08-29 PROCEDURE — 83036 HEMOGLOBIN GLYCOSYLATED A1C: CPT | Performed by: STUDENT IN AN ORGANIZED HEALTH CARE EDUCATION/TRAINING PROGRAM

## 2024-08-29 PROCEDURE — 86364 TISS TRNSGLTMNASE EA IG CLAS: CPT | Performed by: STUDENT IN AN ORGANIZED HEALTH CARE EDUCATION/TRAINING PROGRAM

## 2024-08-29 PROCEDURE — 82570 ASSAY OF URINE CREATININE: CPT | Performed by: STUDENT IN AN ORGANIZED HEALTH CARE EDUCATION/TRAINING PROGRAM

## 2024-08-29 PROCEDURE — 84443 ASSAY THYROID STIM HORMONE: CPT | Performed by: STUDENT IN AN ORGANIZED HEALTH CARE EDUCATION/TRAINING PROGRAM

## 2024-08-29 PROCEDURE — 99999 PR PBB SHADOW E&M-EST. PATIENT-LVL IV: CPT | Mod: PBBFAC,,,

## 2024-08-29 PROCEDURE — 80061 LIPID PANEL: CPT | Performed by: STUDENT IN AN ORGANIZED HEALTH CARE EDUCATION/TRAINING PROGRAM

## 2024-08-29 PROCEDURE — 36415 COLL VENOUS BLD VENIPUNCTURE: CPT | Performed by: STUDENT IN AN ORGANIZED HEALTH CARE EDUCATION/TRAINING PROGRAM

## 2024-08-29 PROCEDURE — 80053 COMPREHEN METABOLIC PANEL: CPT | Performed by: STUDENT IN AN ORGANIZED HEALTH CARE EDUCATION/TRAINING PROGRAM

## 2024-08-29 RX ORDER — BLOOD-GLUCOSE TRANSMITTER
EACH MISCELLANEOUS
Qty: 1 EACH | Refills: 3 | Status: SHIPPED | OUTPATIENT
Start: 2024-08-29

## 2024-08-29 RX ORDER — INSULIN LISPRO 100 [IU]/ML
INJECTION, SOLUTION INTRAVENOUS; SUBCUTANEOUS
Qty: 60 ML | Refills: 3 | Status: SHIPPED | OUTPATIENT
Start: 2024-08-29

## 2024-08-29 RX ORDER — GLUCAGON 3 MG/1
3 POWDER NASAL
Qty: 1 EACH | Refills: 1 | Status: SHIPPED | OUTPATIENT
Start: 2024-08-29

## 2024-08-29 RX ORDER — INSULIN PMP CART,AUT,G6/7,CNTR
1 EACH SUBCUTANEOUS
Qty: 10 EACH | Refills: 11 | Status: SHIPPED | OUTPATIENT
Start: 2024-08-29

## 2024-08-29 RX ORDER — PEN NEEDLE, DIABETIC 30 GX3/16"
1 NEEDLE, DISPOSABLE MISCELLANEOUS DAILY
Qty: 30 EACH | Refills: 3 | Status: SHIPPED | OUTPATIENT
Start: 2024-08-29

## 2024-08-29 RX ORDER — INSULIN GLARGINE 100 [IU]/ML
INJECTION, SOLUTION SUBCUTANEOUS
Qty: 15 ML | Refills: 6 | Status: SHIPPED | OUTPATIENT
Start: 2024-08-29

## 2024-08-29 RX ORDER — BLOOD-GLUCOSE SENSOR
EACH MISCELLANEOUS
Qty: 9 EACH | Refills: 3 | Status: SHIPPED | OUTPATIENT
Start: 2024-08-29

## 2024-08-29 NOTE — ASSESSMENT & PLAN NOTE
Overall control very close to goal - TIR 69% with goal 70% or higher; not having frequent lows  At times feels like the algorithm is not the best -- will run very high for a while and then rapid drop from auto adjustments  We discussed possible interest in Tandem Jagdish -- he would like to meet with an educator to discuss more about  this  For today we will strengthen ISF a little bit and strengthen dinnertime ICR slightly  DM foot exam today  Referred to optometry  Update labs and uACR today

## 2024-08-29 NOTE — ASSESSMENT & PLAN NOTE
Patient Instructions   We will strengthen your dinner carb ratio and strengthen ISF from 40 to 35. If still not strong enough can change to 30.    If you are having lows after dinner go back to ICR 8 instead of 7.5.    Pump Settings (changes in bold)  Basal Rate              MN: 0.450 U/hr              4A:      0.750 U/hr              12P:    0.850 U/hr              4P:      0.900 U/hr              8P:      0.850 U/hr              10P:    0.700 U/hr     Carb Ratio              MN:        1u:8 g              10:30A:      1u:9 g               4P:             1u:7.5 g                ISF              MN:        1u:35 mg/dL              12P:       1u:35 mg/dL     Target/Correct above:    MN: 120/120 mg/dL   6A: 110/120 mg/dL

## 2024-08-29 NOTE — PATIENT INSTRUCTIONS
We will strengthen your dinner carb ratio and strengthen ISF from 40 to 35. If still not strong enough can change to 30.    If you are having lows after dinner go back to ICR 8 instead of 7.5.    Pump Settings (changes in bold)  Basal Rate              MN: 0.450 U/hr              4A:      0.750 U/hr              12P:    0.850 U/hr              4P:      0.900 U/hr              8P:      0.850 U/hr              10P:    0.700 U/hr     Carb Ratio              MN:        1u:8 g              10:30A:      1u:9 g               4P:             1u:7.5 g                ISF              MN:        1u:35 mg/dL              12P:       1u:35 mg/dL     Target/Correct above:    MN: 120/120 mg/dL   6A: 110/120 mg/dL

## 2024-09-03 LAB — TTG IGA SER-ACNC: 0.5 U/ML

## 2024-09-10 ENCOUNTER — PATIENT MESSAGE (OUTPATIENT)
Dept: DIABETES | Facility: CLINIC | Age: 34
End: 2024-09-10
Payer: COMMERCIAL

## 2024-09-18 ENCOUNTER — CLINICAL SUPPORT (OUTPATIENT)
Dept: DIABETES | Facility: CLINIC | Age: 34
End: 2024-09-18
Payer: COMMERCIAL

## 2024-09-18 DIAGNOSIS — E10.9 TYPE 1 DIABETES MELLITUS WITHOUT COMPLICATION: ICD-10-CM

## 2024-09-18 PROCEDURE — G0108 DIAB MANAGE TRN  PER INDIV: HCPCS | Mod: S$GLB,,, | Performed by: INTERNAL MEDICINE

## 2024-09-18 PROCEDURE — 99999 PR PBB SHADOW E&M-EST. PATIENT-LVL I: CPT | Mod: PBBFAC,,,

## 2024-09-18 NOTE — PROGRESS NOTES
Diabetes Care Specialist Progress Note  Author: Belem Tineo RD, CDE  Date: 9/18/2024      Intake  Program Intake  Reason for Diabetes Program Visit:: Intervention  Type of Intervention:: Individual  Individual: Education  Education: Advanced Pump, Insulin Pump Evaluation (pump upgrade discussion)    Current diabetes risk level:: low    Lab Results   Component Value Date    HGBA1C 6.5 (H) 08/29/2024         Current Diabetes Treatment: Insulin  Method of insulin delivery?: Insulin Pump  Type of Pump: OP5          Does patient have back-up plan?: Yes  Any problems obtaining supplies?: No            Continuous Glucose Monitoring  Personal CGM type:: Dexcom G6  GMI Date: 09/18/24  GMI Value: 7 %            Lifestyle Coping Support & Clinical  Lifestyle/Coping/Support  Learning Barriers:: None  Psychosocial/Coping Skills Assessment Completed: : Yes  Area of need?: No           Diabetes Self-Management Skills Assessment  Medication Skills Assessment  Patient is able to identify current diabetes medications, dosages, and appropriate timing of medications.: yes  Patient reports problems or concerns with current medication regimen.: no  Patient is  aware that some diabetes medications can cause low blood sugar?: Yes  Medication Skills Assessment Completed:: Yes  Area of need?: Yes (discuss other insulin pump options)    Diabetes Disease Process/Treatment Options  Diabetes Type?: Type I  When were you diagnosed?: age 10  Is patient aware of what causes diabetes?: Yes  Area of need?: No    Home Blood Glucose Monitoring  Patient states that blood sugar is checked at home daily.: yes  Personal CGM type:: Dexcom G6  Area of need?: No                Assessment Summary and Plan    Based on today's diabetes care assessment, the following areas of need were identified:          9/18/2024    12:01 AM   Areas of Need   Medications/Current Diabetes Treatment Yes  See Care Plan     Lifestyle Coping Support No   Diabetes Disease  Process/Treatment Options No   Home Blood Glucose Monitoring No     Today's interventions were provided through individual discussion, instruction, and written materials were provided.      Patient verbalized understanding of instruction and written materials.  Pt was able to return back demonstration of instructions today. Patient understood key points, needs reinforcement and further instruction.               Diabetes Self-Management Care Plan:    Today's Diabetes Self-Management Care Plan was developed with Jamshid's input. Jamshid has agreed to work toward the following goal(s) to improve his/her overall diabetes control.      Care Plan: Diabetes Management   Updates made since 8/20/2024 12:00 AM        Problem: Medications         Goal: Patient Agrees to use insulin pump as instructed        Jamshid is here to discuss insulin pump options other than OP5.               Patient is interested in upgrading his insulin pump and continuous glucose monitor. Current pump not in warranty (OP5)  Covered basic details of pump therapy with patient.   Reviewed major insulin pump vendors:   [employer]: 780G  Tandem: T-slim X 2, Mobi  Insulet:  Omni Pod 5  Beta Bionics: Islet  Discussed unique features of each pump.   Patient was able to view and practice actual device use--using demo pumps and supplies.   Reviewed pumps with CGM capability: Medronic 670G with Guardian sensor and TSlim:X2 with Basal IQ/Dexcom G6 integration.  Reviewed terms ISF, CHO ratio, goal BG, bolus, basal, active insulin and insulin on board.   Explained each pump allows for different max volumes of insulin in reservoir chamber.  Pt verbalized clearer understanding of pump and CGM therapy.   Reviewed patient responsibilities and expectations for insulin pump approval by provider and insurance company  Encouraged patient to check with their insurance provider regarding information such as possible costs associated with initial and monthly pump costs.        Does patient have a current CHO Ratio and ISF determined by Endocrine Provider?  Yes - to transfer from old pump          Wants to order MOBI  Will reach out to provider             Task: Reviewed with patient all current diabetes medications and provided basic review of the purpose, dosage, frequency, side effects, and storage of both oral and injectable diabetes medications. Completed 9/19/2024        Task: Discussed guidelines for preventing, detecting and treating hypoglycemia and hyperglycemia and reviewed the importance of meal and medication timing with diabetes mediations for prevention of hypoglycemia and maximum drug benefit. Completed 9/19/2024                    Follow Up Plan     F/u for MOBI training          Today's care plan and follow up schedule was discussed with patient.  Jamshid verbalized understanding of the care plan, goals, and agrees to follow up plan.        The patient was encouraged to communicate with his/her health care provider/physician and care team regarding his/her condition(s) and treatment.  I provided the patient with my contact information today and encouraged to contact me via phone or [facility] Patient Portal as needed.           Length of Visit   Total Time: 60 Minutes

## 2024-10-23 ENCOUNTER — PATIENT MESSAGE (OUTPATIENT)
Dept: DIABETES | Facility: CLINIC | Age: 34
End: 2024-10-23
Payer: COMMERCIAL

## 2024-11-04 DIAGNOSIS — E10.9 TYPE 1 DIABETES MELLITUS WITHOUT COMPLICATION: Primary | ICD-10-CM

## 2024-11-04 RX ORDER — BLOOD-GLUCOSE SENSOR
1 EACH MISCELLANEOUS
Qty: 3 EACH | Refills: 11 | Status: SHIPPED | OUTPATIENT
Start: 2024-11-04 | End: 2025-11-04

## 2024-11-19 NOTE — PROGRESS NOTES
Pump Clinic Return Visit  11/20/2024    Jamshid Norwood is a 34 y.o.male presenting for follow-up of type 1 DM      The patient's last visit was in Aug 2024  At that time, his dinner carb ratio and ISF for strengthened.  He has also met with diabetes education to switch to Tandem Mobi from Omnipod. Has been doing well with new pump - only had one episode of occlusion a week ago.    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10 (2001)    Known complications: possible retinopathy although changes may be related to previous trauma to R eye    Current diabetes regimen:  Pump: Tandem Mobi    Time Basal Rate   (units/hr) Correction Factor   (units:mg/dL) Carb Ratio   (units:grams) Target BG   (mg/dL)   12:00 AM 0.600 1:35 1:8.0 110   4:00 AM 0.900 1:35 1:7.5 110   10:30 AM 0.850 1:35 1:9.0 110   12:00 PM 0.950 1:35 1:9.0 110   4:00 PM 1.050 1:35 1:7.5 110   8:00 PM 0.850 1:35 1:7.5 110   10:00 PM 0.800 1:35 1:7.5 110     Total Daily Basal: 20.825 units  Insulin Duration: 3 hr 30 min      TIR improved but left exercise mode on for 10 days so had some high BG values d/t higher target.  Also having early morning lows despite exercise mode.    Lab Results   Component Value Date    HGBA1C 6.5 (H) 08/29/2024       Glucagon: has Baqsimi    Back-up basal insulin: has Lantus      Glucose Monitoring:  Meter/CGM: Dexcom G7 - CGM/pump data reviewed. Report in media tab.    Hypoglycemic Episodes:  Not waking him up at night but does have early AM    DKA: denies; never       Diet/Exercise:  No changes to diet, eats pretty low carb for breakfast and lunch  Not much exercise     Screening / DM Complications:  Neuropathy: denies  Last foot exam : 08/29/2024 -- done at last appt  Last eye exam : 06/09/2021; needs to make appt  CVD/MI: no  Nephropathy: no  Lab Results   Component Value Date    MICALBCREAT Unable to calculate 08/29/2024     Lab Results   Component Value Date    TSH  1.435 08/29/2024     Lab Results   Component Value Date    TTGIGA 0.5 08/29/2024     Lipids: not taking statin currently  Lab Results   Component Value Date    CHOL 159 08/29/2024    TRIG 41 08/29/2024    HDL 59 08/29/2024    LDLCALC 91.8 08/29/2024    CHOLHDL 37.1 08/29/2024         ROS as above    Objective:     Vitals:    11/20/24 0854   BP: 116/76   Pulse: 100       Wt Readings from Last 5 Encounters:   11/20/24 91.2 kg (200 lb 15.2 oz)   08/29/24 89.9 kg (198 lb 4.9 oz)   12/20/23 86.2 kg (190 lb 0.6 oz)   09/20/23 86.8 kg (191 lb 5.8 oz)   11/30/22 81.5 kg (179 lb 12.6 oz)        Body mass index is 28.03 kg/m².  Constitutional:  Pleasant,  in no acute distress.   HENT:   Eyes:    No scleral icterus.   Respiratory:  Effort normal   Neurological:  normal speech  Psych:  Normal mood and affect.        Assessment and Plan     Problem List Items Addressed This Visit          Endocrine    Type 1 diabetes - Primary     CGM data reviewed. TIR improved compared to Omnipod, patient overrides boluses much less than previously, overall better control. However he does have low AM BG and high post-breakfast values.  We will strengthen his breakfast ICR and simplify his pump time settings.    DM maintenance discussed in HPI  Needs to see ophtho, last seen 2021  Labs up to date         Relevant Orders    Hemoglobin A1C    Insulin pump in place     Patient Instructions   Pump settings (changes in bold)  Time Basal Rate   (units/hr) Correction Factor   (units:mg/dL) Carb Ratio   (units:grams) Target BG   (mg/dL)   12:00 AM 0.600 1:35 1:8.0 110   6:00 AM 0.900 1:35 1:6.5 110   11:00 PM 0.950 1:35 1:9.0 110   4:00 PM 0.950 1:35 1:7.5 110   8:00 PM 0.850 1:35 1:7.5 110   10:00 PM 0.800 1:35 1:7.5 110     Bolus for protein after meals (1/3 of your carb ratio per gram of protein)    If still having AM lows, go down to 0.550 on 12 AM rate.             RTC 3 mo    Jaida Rivas MD

## 2024-11-20 ENCOUNTER — OFFICE VISIT (OUTPATIENT)
Dept: ENDOCRINOLOGY | Facility: CLINIC | Age: 34
End: 2024-11-20
Payer: COMMERCIAL

## 2024-11-20 VITALS
OXYGEN SATURATION: 96 % | WEIGHT: 200.94 LBS | SYSTOLIC BLOOD PRESSURE: 116 MMHG | HEIGHT: 71 IN | HEART RATE: 100 BPM | BODY MASS INDEX: 28.13 KG/M2 | DIASTOLIC BLOOD PRESSURE: 76 MMHG

## 2024-11-20 DIAGNOSIS — E10.9 TYPE 1 DIABETES MELLITUS WITHOUT COMPLICATION: Primary | ICD-10-CM

## 2024-11-20 DIAGNOSIS — Z96.41 INSULIN PUMP IN PLACE: ICD-10-CM

## 2024-11-20 PROCEDURE — 3061F NEG MICROALBUMINURIA REV: CPT | Mod: CPTII,S$GLB,, | Performed by: INTERNAL MEDICINE

## 2024-11-20 PROCEDURE — 3044F HG A1C LEVEL LT 7.0%: CPT | Mod: CPTII,S$GLB,, | Performed by: INTERNAL MEDICINE

## 2024-11-20 PROCEDURE — 3066F NEPHROPATHY DOC TX: CPT | Mod: CPTII,S$GLB,, | Performed by: INTERNAL MEDICINE

## 2024-11-20 PROCEDURE — 3078F DIAST BP <80 MM HG: CPT | Mod: CPTII,S$GLB,, | Performed by: INTERNAL MEDICINE

## 2024-11-20 PROCEDURE — 99214 OFFICE O/P EST MOD 30 MIN: CPT | Mod: 25,S$GLB,, | Performed by: INTERNAL MEDICINE

## 2024-11-20 PROCEDURE — 3074F SYST BP LT 130 MM HG: CPT | Mod: CPTII,S$GLB,, | Performed by: INTERNAL MEDICINE

## 2024-11-20 PROCEDURE — 1159F MED LIST DOCD IN RCRD: CPT | Mod: CPTII,S$GLB,, | Performed by: INTERNAL MEDICINE

## 2024-11-20 PROCEDURE — 3008F BODY MASS INDEX DOCD: CPT | Mod: CPTII,S$GLB,, | Performed by: INTERNAL MEDICINE

## 2024-11-20 PROCEDURE — 99999 PR PBB SHADOW E&M-EST. PATIENT-LVL IV: CPT | Mod: PBBFAC,,,

## 2024-11-20 NOTE — ASSESSMENT & PLAN NOTE
Patient Instructions   Pump settings (changes in bold)  Time Basal Rate   (units/hr) Correction Factor   (units:mg/dL) Carb Ratio   (units:grams) Target BG   (mg/dL)   12:00 AM 0.600 1:35 1:8.0 110   6:00 AM 0.900 1:35 1:6.5 110   11:00 PM 0.950 1:35 1:9.0 110   4:00 PM 0.950 1:35 1:7.5 110   8:00 PM 0.850 1:35 1:7.5 110   10:00 PM 0.800 1:35 1:7.5 110     Bolus for protein after meals (1/3 of your carb ratio per gram of protein)    If still having AM lows, go down to 0.550 on 12 AM rate.

## 2024-11-20 NOTE — PATIENT INSTRUCTIONS
Pump settings (changes in bold)  Time Basal Rate   (units/hr) Correction Factor   (units:mg/dL) Carb Ratio   (units:grams) Target BG   (mg/dL)   12:00 AM 0.600 1:35 1:8.0 110   6:00 AM 0.900 1:35 1:6.5 110   11:00 PM 0.950 1:35 1:9.0 110   4:00 PM 0.950 1:35 1:7.5 110   8:00 PM 0.850 1:35 1:7.5 110   10:00 PM 0.800 1:35 1:7.5 110     Bolus for protein after meals (1/3 of your carb ratio per gram of protein)    If still having AM lows, go down to 0.550 on 12 AM rate.

## 2024-11-20 NOTE — ASSESSMENT & PLAN NOTE
CGM data reviewed. TIR improved compared to Omnipod, patient overrides boluses much less than previously, overall better control. However he does have low AM BG and high post-breakfast values.  We will strengthen his breakfast ICR and simplify his pump time settings.    DM maintenance discussed in HPI  Needs to see bay, last seen 2021  Labs up to date

## 2024-11-22 NOTE — PROGRESS NOTES
I have reviewed and concur with   's history, physical, assessment, and plan.  I have personally interviewed and examined the patient.  See below addendum for my evaluation and additional findings.    Long discussion regarding corrections and carb  Advised to give bolus for protein after meal (1/3 of his dose for carb: 1:10 for carb and 1:30 for protein)  Also advised to allow pump to give correction and if sees still not at goal, may need to strength correction  Changing pump sites appropriately.    Visit today included increased complexity associated with the care of the problems addressed and managing the longitudinal care of the patient due to the serious and/or complex managed problems     Dana Ventura MD

## 2025-02-19 ENCOUNTER — LAB VISIT (OUTPATIENT)
Dept: LAB | Facility: HOSPITAL | Age: 35
End: 2025-02-19
Payer: COMMERCIAL

## 2025-02-19 DIAGNOSIS — E10.9 TYPE 1 DIABETES MELLITUS WITHOUT COMPLICATION: ICD-10-CM

## 2025-02-19 LAB
ESTIMATED AVG GLUCOSE: 137 MG/DL (ref 68–131)
HBA1C MFR BLD: 6.4 % (ref 4–5.6)

## 2025-02-19 PROCEDURE — 36415 COLL VENOUS BLD VENIPUNCTURE: CPT | Performed by: STUDENT IN AN ORGANIZED HEALTH CARE EDUCATION/TRAINING PROGRAM

## 2025-02-19 PROCEDURE — 83036 HEMOGLOBIN GLYCOSYLATED A1C: CPT | Performed by: STUDENT IN AN ORGANIZED HEALTH CARE EDUCATION/TRAINING PROGRAM

## 2025-02-25 NOTE — PROGRESS NOTES
Pump Clinic Return Visit  02/26/2025    Jamshid Norwood is a 34 y.o.male presenting for follow-up of type 1 DM      The patient's last visit was in Nov 2024  At that time, we strengthened his breakfast ICR and simplified his pump time settings.      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  TIR for last 2 weeks slightly worse than previous likely 2/2 site issues - tubes were getting kinked for some reason, mostly when using pump in his leg.  He is interested in trying TruSteel cannula to troubleshoot this.     Lab Results   Component Value Date    HGBA1C 6.4 (H) 02/19/2025       Glucagon: has Baqsimi    Back-up basal insulin: has Lantus      Glucose Monitoring:  Meter/CGM: Dexcom G7 - CGM/pump data reviewed. Report in media tab.    Hypoglycemic Episodes:  Only happening once a week    DKA: denies;never     Diet/Exercise:  No changes to diet, eats pretty low carb for breakfast and lunch  Not much exercise     Screening / DM Complications:  Neuropathy: denies  Last foot exam : 08/29/2024 -- done at last appt  Last eye exam : 06/09/2021; needs to make appt    CVD/MI: no  Nephropathy: no  Lab Results   Component Value Date    MICALBCREAT Unable to calculate 08/29/2024     Lab Results   Component Value Date    TSH 1.435 08/29/2024     Lab Results   Component Value Date    TTGIGA 0.5 08/29/2024     Lipids: not taking statin  currently  Lab Results   Component Value Date    CHOL 159 08/29/2024    TRIG 41 08/29/2024    HDL 59 08/29/2024    LDLCALC 91.8 08/29/2024    CHOLHDL 37.1 08/29/2024         ROS as above    Objective:     Vitals:    02/26/25 0839   BP: 114/74   Pulse: 97         Wt Readings from Last 5 Encounters:   02/26/25 90.5 kg (199 lb 8.3 oz)   11/20/24 91.2 kg (200 lb 15.2 oz)   08/29/24 89.9 kg (198 lb 4.9 oz)   12/20/23 86.2 kg (190 lb 0.6 oz)   09/20/23 86.8 kg (191 lb 5.8 oz)        Body mass index is 27.83 kg/m².  Constitutional:  Pleasant,  in no acute distress.   HENT:   Eyes:    No scleral icterus.   Respiratory:  Effort normal   Neurological:  normal speech  Psych:  Normal mood and affect.        Assessment and Plan     Problem List Items Addressed This Visit          Endocrine    Type 1 diabetes - Primary    CGM data reviewed. TIR for last 2 weeks slightly worse than previous likely due to tubing obstruction mostly when using pump in his leg.    Provided patient with samples of TruSteel cannula to troubleshoot this. He will let us know if he would like a prescription for this in the future.    Also instructed pt to bolus for fats and protein when he's having heavier meals.    DM maintenance discussed in HPI  Needs to see bay, last seen 2021  Labs up to date         Insulin pump in place    Pump settings (changes in bold)  Time Basal Rate   (units/hr) Correction Factor   (units:mg/dL) Carb Ratio   (units:grams) Target BG   (mg/dL)   12:00 AM 0.600 1:35 1:8.0 110   6:00 AM 0.900 1:35 1:6.5 110   11:00 PM 0.950 1:35 1:9.0 110   4:00 PM 0.950 1:35 1:7.5 110   8:00 PM 0.850 1:35 1:7.5 110   10:00 PM 0.800 1:35 1:7.5 110      Bolus for protein after meals (1/3 of your carb ratio per gram of protein)             RTC 3 mo    Jaida Rivas MD

## 2025-02-26 ENCOUNTER — OFFICE VISIT (OUTPATIENT)
Dept: ENDOCRINOLOGY | Facility: CLINIC | Age: 35
End: 2025-02-26
Payer: COMMERCIAL

## 2025-02-26 VITALS
OXYGEN SATURATION: 99 % | HEART RATE: 97 BPM | SYSTOLIC BLOOD PRESSURE: 114 MMHG | WEIGHT: 199.5 LBS | DIASTOLIC BLOOD PRESSURE: 74 MMHG | BODY MASS INDEX: 27.93 KG/M2 | HEIGHT: 71 IN

## 2025-02-26 DIAGNOSIS — E10.9 TYPE 1 DIABETES MELLITUS WITHOUT COMPLICATION: Primary | ICD-10-CM

## 2025-02-26 DIAGNOSIS — Z96.41 INSULIN PUMP IN PLACE: ICD-10-CM

## 2025-02-26 PROCEDURE — 99999 PR PBB SHADOW E&M-EST. PATIENT-LVL IV: CPT | Mod: PBBFAC,,,

## 2025-02-26 NOTE — ASSESSMENT & PLAN NOTE
CGM data reviewed. TIR for last 2 weeks slightly worse than previous likely due to tubing obstruction mostly when using pump in his leg.    Provided patient with samples of TruSteel cannula to troubleshoot this. He will let us know if he would like a prescription for this in the future.    Also instructed pt to bolus for fats and protein when he's having heavier meals.    DM maintenance discussed in HPI  Needs to see bay, last seen 2021  Labs up to date

## 2025-02-26 NOTE — ASSESSMENT & PLAN NOTE
Pump settings (changes in bold)  Time Basal Rate   (units/hr) Correction Factor   (units:mg/dL) Carb Ratio   (units:grams) Target BG   (mg/dL)   12:00 AM 0.600 1:35 1:8.0 110   6:00 AM 0.900 1:35 1:6.5 110   11:00 PM 0.950 1:35 1:9.0 110   4:00 PM 0.950 1:35 1:7.5 110   8:00 PM 0.850 1:35 1:7.5 110   10:00 PM 0.800 1:35 1:7.5 110      Bolus for protein after meals (1/3 of your carb ratio per gram of protein)

## 2025-05-28 ENCOUNTER — OFFICE VISIT (OUTPATIENT)
Dept: ENDOCRINOLOGY | Facility: CLINIC | Age: 35
End: 2025-05-28
Payer: COMMERCIAL

## 2025-05-28 VITALS
HEIGHT: 71 IN | SYSTOLIC BLOOD PRESSURE: 112 MMHG | BODY MASS INDEX: 27.52 KG/M2 | HEART RATE: 81 BPM | DIASTOLIC BLOOD PRESSURE: 76 MMHG | WEIGHT: 196.56 LBS | OXYGEN SATURATION: 97 %

## 2025-05-28 DIAGNOSIS — E10.9 TYPE 1 DIABETES MELLITUS WITHOUT COMPLICATION: ICD-10-CM

## 2025-05-28 DIAGNOSIS — Z96.41 INSULIN PUMP IN PLACE: Primary | ICD-10-CM

## 2025-05-28 PROCEDURE — G2211 COMPLEX E/M VISIT ADD ON: HCPCS | Mod: S$GLB,,, | Performed by: INTERNAL MEDICINE

## 2025-05-28 PROCEDURE — 3044F HG A1C LEVEL LT 7.0%: CPT | Mod: CPTII,S$GLB,, | Performed by: INTERNAL MEDICINE

## 2025-05-28 PROCEDURE — 3078F DIAST BP <80 MM HG: CPT | Mod: CPTII,S$GLB,, | Performed by: INTERNAL MEDICINE

## 2025-05-28 PROCEDURE — 99999 PR PBB SHADOW E&M-EST. PATIENT-LVL IV: CPT | Mod: PBBFAC,,,

## 2025-05-28 PROCEDURE — 3008F BODY MASS INDEX DOCD: CPT | Mod: CPTII,S$GLB,, | Performed by: INTERNAL MEDICINE

## 2025-05-28 PROCEDURE — 1159F MED LIST DOCD IN RCRD: CPT | Mod: CPTII,S$GLB,, | Performed by: INTERNAL MEDICINE

## 2025-05-28 PROCEDURE — 3074F SYST BP LT 130 MM HG: CPT | Mod: CPTII,S$GLB,, | Performed by: INTERNAL MEDICINE

## 2025-05-28 PROCEDURE — 99214 OFFICE O/P EST MOD 30 MIN: CPT | Mod: S$GLB,,, | Performed by: INTERNAL MEDICINE

## 2025-05-28 NOTE — PATIENT INSTRUCTIONS
Time Basal Rate   (units/hr) Correction Factor   (units:mg/dL) Carb Ratio   (units:grams) Target BG   (mg/dL)   12:00 AM 0.600 1:35 1:8.0 110   6:00 AM 0.900 1:35 1:6.5 110   11:00 AM 0.950 1:35 1:9.0 110   4:00 PM 0.950 1:35 1:7.5 110   6:00 PM 0.850 1:35 1:6.5 110   10:00 PM 0.800 1:35 1:7.5 110     Try TruSteel cannulas for occlusion issues.  Try scheduling sleep mode between midnight and 6 AM.

## 2025-05-28 NOTE — PROGRESS NOTES
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  Having highs for dinner then overnight lows as a consequence.  Having obstruction-type issues towards last 24h of site, TruSteel cannula has helped with this in the past.     Lab Results   Component Value Date    HGBA1C 6.4 (H) 02/19/2025       Glucagon: has Baqsimi    Back-up basal insulin: has Lantus      Glucose Monitoring:  Meter/CGM: Dexcom G7 - CGM/pump data reviewed. Report in media tab.    Hypoglycemic Episodes:  Only happening once a week    DKA: denies;never     Diet/Exercise:  No changes to diet, eats pretty low carb for breakfast and lunch  Not much exercise     Screening / DM Complications:  Neuropathy: denies  Last foot exam : 08/29/2024 -- done at last appt  Last eye exam - will make an appt for summer    CVD/MI: no  Nephropathy: no  Lab Results   Component Value Date    MICALBCREAT Unable to calculate 08/29/2024     Lab Results   Component Value Date    TSH 1.435 08/29/2024     Lab Results   Component Value Date    TTGIGA 0.5 08/29/2024     Lipids: not taking statin currently  Lab Results   Component Value Date    CHOL 159 08/29/2024    TRIG 41 08/29/2024    HDL 59 08/29/2024    LDLCALC 91.8 08/29/2024     CHOLHDL 37.1 08/29/2024         ROS as above    Objective:     Vitals:    05/28/25 0906   BP: 112/76   Pulse: 81         Wt Readings from Last 5 Encounters:   05/28/25 89.1 kg (196 lb 8.6 oz)   02/26/25 90.5 kg (199 lb 8.3 oz)   11/20/24 91.2 kg (200 lb 15.2 oz)   08/29/24 89.9 kg (198 lb 4.9 oz)   12/20/23 86.2 kg (190 lb 0.6 oz)        Body mass index is 27.41 kg/m².  Constitutional:  Pleasant,  in no acute distress.   HENT:   Eyes:    No scleral icterus.   Respiratory:  Effort normal   Neurological:  normal speech  Psych:  Normal mood and affect.        Assessment and Plan     Problem List Items Addressed This Visit          Endocrine    Type 1 diabetes    CGM data reviewed, pattern of nighttime highs and overnight lows. He usually has more carb-heavy meals in the PM.    Pt will request TruSteel cannulas from [employer].    We will adjust PM ICR.    DM maintenance discussed in HPI  Needs to see bay, last seen 2021  Labs up to date         Insulin pump in place - Primary    Tandem mobi with Humalog    Patient Instructions     Time Basal Rate   (units/hr) Correction Factor   (units:mg/dL) Carb Ratio   (units:grams) Target BG   (mg/dL)   12:00 AM 0.600 1:35 1:8.0 110   6:00 AM 0.900 1:35 1:6.5 110   11:00 PM 0.950 1:35 1:9.0 110   4:00 PM 0.950 1:35 1:7.5 110   6:00 PM 0.850 1:35 1:6.5 110   10:00 PM 0.800 1:35 1:7.5 110     Try TruSteel cannulas for occlusion issues.  Try scheduling sleep mode between midnight and 6 AM.              Visit today included increased complexity associated with the care of the episodic problem addressed and managing the longitudinal care of the patient due to the serious and/or complex managed problem(s).       RTC 3 mo    Jaida Rivas MD

## 2025-05-28 NOTE — ASSESSMENT & PLAN NOTE
Tandem mobi with Humalog    Patient Instructions     Time Basal Rate   (units/hr) Correction Factor   (units:mg/dL) Carb Ratio   (units:grams) Target BG   (mg/dL)   12:00 AM 0.600 1:35 1:8.0 110   6:00 AM 0.900 1:35 1:6.5 110   11:00 PM 0.950 1:35 1:9.0 110   4:00 PM 0.950 1:35 1:7.5 110   6:00 PM 0.850 1:35 1:6.5 110   10:00 PM 0.800 1:35 1:7.5 110     Try TruSteel cannulas for occlusion issues.  Try scheduling sleep mode between midnight and 6 AM.

## 2025-05-28 NOTE — ASSESSMENT & PLAN NOTE
CGM data reviewed, pattern of nighttime highs and overnight lows. He usually has more carb-heavy meals in the PM.    Pt will request TruSteel cannulas from [employer].    We will adjust PM ICR.    DM maintenance discussed in HPI  Needs to see bay, last seen 2021  Labs up to date

## 2025-05-29 NOTE — PROGRESS NOTES
I have reviewed and concur with Dr. Carl's history, physical, assessment, and plan.  I have personally interviewed and examined the patient.  See below addendum for my evaluation and additional findings.    Discussed multiple issues today including   Auto suspends with rebound hyperglycemia   Multiple autocorrections after meals (carb counting v strengthening icr)  As patient's schedule more manageable during the summer will work on timing of bolus 10 - 15 min before meals,  strengthen ICR at dinner, preset sleep mode and careful off trying exercise mode 15 - 20 min before exercise    Visit today included increased complexity associated with the care of the problems addressed and managing the longitudinal care of the patient due to the serious and/or complex managed problems     Dana Ventura MD

## 2025-08-06 ENCOUNTER — OFFICE VISIT (OUTPATIENT)
Dept: ENDOCRINOLOGY | Facility: CLINIC | Age: 35
End: 2025-08-06
Payer: COMMERCIAL

## 2025-08-06 DIAGNOSIS — E10.65 TYPE 1 DIABETES MELLITUS WITH HYPERGLYCEMIA: Primary | ICD-10-CM

## 2025-08-06 DIAGNOSIS — Z96.41 INSULIN PUMP IN PLACE: ICD-10-CM

## 2025-08-06 NOTE — ASSESSMENT & PLAN NOTE
- Initially diagnosed at age 10  - Currently on Tandem Mobi/Dexcom G7.   - Pump/CGM data downloaded and reviewed: TIR 68%, GMI 7.1%, Control IQ active 98%  - Overall, worse control compared to prior. He attributes it to being busy moving and making poor dietary choices.   - Has also been having lots of infusion site issues/blockages. Denies tissue scarring or bent cannulas. Rotates appropriately.  - Waiting on TruSteel cannulas from [employer] (there was issue with delivery)  - Given control worse due to behavior changes and site issues, will not change pump settings today.   - DM maintenance discussed in HPI  - Needs to see ophtho, last seen 2021  - Labs up to date

## 2025-08-06 NOTE — PROGRESS NOTES
The patient location is: [place]  The chief complaint leading to consultation is: T1DM    Visit type: audiovisual    Face to Face time with patient: 25  35 minutes of total time spent on the encounter, which includes face to face time and non-face to face time preparing to see the patient (eg, review of tests), Obtaining and/or reviewing separately obtained history, Documenting clinical information in the electronic or other health record, Independently interpreting results (not separately reported) and communicating results to the patient/family/caregiver, or Care coordination (not separately reported).     Each patient to whom he or she provides medical services by telemedicine is:  (1) informed of the relationship between the physician and patient and the respective role of any other health care provider with respect to management of the patient; and (2) notified that he or she may decline to receive medical services by telemedicine and may withdraw from such care at any time.    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 Plans to move to [place] this week and to [place] in the next few months.   Doesn't like the tandem mobi, having lots of issues with it, especially site issues.   Reports he has been consistent site occlusions, especially on day 3. Denies tissue scarring or bent cannulas. Rotates his sites every 3 days.   Has been having issues with delivery of supplies as well, last batch missing his trusteel cannulas.   Overall frustrated with everything that's been going on.       Current diabetes  regimen:  Pump: Tandem Mobi  Pump Settings:        Glucose Monitoring: Dexcom G7      CGM Review:  Overall worse control compared to prior, likely secondary to poor dietary choices/eating out more  Having obstruction-type issues towards last 24h of site, TruSteel cannula has helped with this in the past, but he does not have any at the moment.    Very rare hypoglycemia, usually after he inputs too many corrections boluses.     Lab Results   Component Value Date    HGBA1C 6.4 (H) 02/19/2025       Glucagon: has Baqsimi    Back-up basal insulin: has Lantus        Hypoglycemic Episodes:  As above    DKA: denies;never     Diet/Exercise:  Usually eats pretty low carb for breakfast and lunch, but lately eating out a lot with him being busy with moving.   Not much exercise     Screening / DM Complications:  Neuropathy: denies  Last foot exam : 08/29/2024 Will plan on doing one at next visit.   Last eye exam - Overdue, needs to make an appointment soon.     CVD/MI: no  Nephropathy: no  Lab Results   Component Value Date    MICALBCREAT Unable to calculate 08/29/2024     Lab Results   Component Value Date    TSH 1.435 08/29/2024     Lab Results   Component Value Date    TTGIGA 0.5 08/29/2024     Lipids: not taking statin currently  Lab Results   Component Value Date    CHOL 159 08/29/2024    TRIG 41 08/29/2024    HDL 59 08/29/2024    LDLCALC 91.8 08/29/2024    CHOLHDL 37.1 08/29/2024         ROS as above    Objective:     There were no vitals filed for this visit.    Wt Readings from Last 5 Encounters:   05/28/25 89.1 kg (196 lb 8.6 oz)   02/26/25 90.5 kg (199 lb 8.3 oz)   11/20/24 91.2 kg (200 lb 15.2 oz)   08/29/24 89.9 kg (198 lb 4.9 oz)   12/20/23 86.2 kg (190 lb 0.6 oz)        There is no height or weight on file to calculate BMI.  Constitutional:  Well appearing,  in no acute distress.   HENT:   Eyes:    No scleral icterus.   Respiratory:   Effort normal   Neurological:  Normal speech  Psych:  Normal mood and affect.         Assessment and Plan     Problem List Items Addressed This Visit          1 - High    Type 1 diabetes - Primary    - Initially diagnosed at age 10  - Currently on Tandem Mobi/Dexcom G7.   - Pump/CGM data downloaded and reviewed: TIR 68%, GMI 7.1%, Control IQ active 98%  - Overall, worse control compared to prior. He attributes it to being busy moving and making poor dietary choices.   - Has also been having lots of infusion site issues/blockages. Denies tissue scarring or bent cannulas. Rotates appropriately.  - Waiting on TruSteel cannulas from [employer] (there was issue with delivery)  - Given control worse due to behavior changes and site issues, will not change pump settings today.   - DM maintenance discussed in HPI  - Needs to see jeanetho, last seen 2021  - Labs up to date            2     Insulin pump in place    Tandem mobi with Humalog    Patient Instructions      Time Basal Rate   (units/hr) Correction Factor   (units:mg/dL) Carb Ratio   (units:grams) Target BG   (mg/dL)   12:00 AM 0.600 1:35 1:8.0 110   6:00 AM 0.900 1:35 1:6.5 110   11:00 PM 0.950 1:35 1:9.0 110   4:00 PM 0.950 1:35 1:7.5 110   6:00 PM 0.850 1:35 1:6.5 110   10:00 PM 0.800 1:35 1:7.5 110                 RTC in 1-2 months, can be virtual if in [place].      Tianna Izquierdo MD  [facility], 6th Floor  [address] [place], [address]    Office: [phone]  Fax:    [phone]    The above history, labs, imaging, impression, and plan were discussed with attending physician who is in agreement and also took part in this patient's care.    I personally reviewed all of the patient's available medications, labs, imaging, vitals, allergies, medical history.    Recommendations were discussed with the patient in detail. The patient verbalized understanding and agrees with the plan outlined as above.     Visit today included increased complexity associated with the care of the problems addressed and managing  the longitudinal care of the patient due to the serious and/or complex managed problems.

## 2025-08-06 NOTE — ASSESSMENT & PLAN NOTE
Tandem mobi with Humalog    Patient Instructions      Time Basal Rate   (units/hr) Correction Factor   (units:mg/dL) Carb Ratio   (units:grams) Target BG   (mg/dL)   12:00 AM 0.600 1:35 1:8.0 110   6:00 AM 0.900 1:35 1:6.5 110   11:00 PM 0.950 1:35 1:9.0 110   4:00 PM 0.950 1:35 1:7.5 110   6:00 PM 0.850 1:35 1:6.5 110   10:00 PM 0.800 1:35 1:7.5 110

## 2025-08-11 ENCOUNTER — PATIENT MESSAGE (OUTPATIENT)
Dept: ENDOCRINOLOGY | Facility: CLINIC | Age: 35
End: 2025-08-11
Payer: COMMERCIAL

## 2025-08-12 ENCOUNTER — TELEPHONE (OUTPATIENT)
Dept: ENDOCRINOLOGY | Facility: CLINIC | Age: 35
End: 2025-08-12
Payer: COMMERCIAL

## 2025-08-12 DIAGNOSIS — Z96.41 INSULIN PUMP IN PLACE: Primary | ICD-10-CM

## 2025-08-12 DIAGNOSIS — E10.65 TYPE 1 DIABETES MELLITUS WITH HYPERGLYCEMIA: ICD-10-CM

## 2025-08-12 RX ORDER — INSULIN PMP CART,AUT,G6/7,CNTR
1 EACH SUBCUTANEOUS ONCE
Qty: 1 EACH | Refills: 0 | Status: SHIPPED | OUTPATIENT
Start: 2025-08-12 | End: 2025-08-12